# Patient Record
Sex: FEMALE | Race: WHITE | NOT HISPANIC OR LATINO | Employment: FULL TIME | URBAN - METROPOLITAN AREA
[De-identification: names, ages, dates, MRNs, and addresses within clinical notes are randomized per-mention and may not be internally consistent; named-entity substitution may affect disease eponyms.]

---

## 2017-01-01 ENCOUNTER — TRANSFERRED RECORDS (OUTPATIENT)
Dept: HEALTH INFORMATION MANAGEMENT | Facility: CLINIC | Age: 22
End: 2017-01-01

## 2017-01-01 LAB — PAP SMEAR - HIM PATIENT REPORTED: NEGATIVE

## 2019-03-12 ENCOUNTER — OFFICE VISIT (OUTPATIENT)
Dept: FAMILY MEDICINE | Facility: CLINIC | Age: 24
End: 2019-03-12
Payer: COMMERCIAL

## 2019-03-12 VITALS
DIASTOLIC BLOOD PRESSURE: 70 MMHG | TEMPERATURE: 97.4 F | OXYGEN SATURATION: 99 % | WEIGHT: 167 LBS | HEART RATE: 103 BPM | HEIGHT: 63 IN | BODY MASS INDEX: 29.59 KG/M2 | SYSTOLIC BLOOD PRESSURE: 122 MMHG

## 2019-03-12 DIAGNOSIS — Z00.00 ENCOUNTER FOR ROUTINE ADULT HEALTH EXAMINATION WITHOUT ABNORMAL FINDINGS: Primary | ICD-10-CM

## 2019-03-12 DIAGNOSIS — Z11.3 SCREENING EXAMINATION FOR VENEREAL DISEASE: ICD-10-CM

## 2019-03-12 DIAGNOSIS — E55.9 VITAMIN D DEFICIENCY: ICD-10-CM

## 2019-03-12 DIAGNOSIS — Z30.09 FAMILY PLANNING COUNSELING: ICD-10-CM

## 2019-03-12 LAB
DEPRECATED CALCIDIOL+CALCIFEROL SERPL-MC: 24 UG/L (ref 20–75)
GLUCOSE SERPL-MCNC: 92 MG/DL (ref 70–99)
HGB BLD-MCNC: 13.1 G/DL (ref 11.7–15.7)

## 2019-03-12 PROCEDURE — 36415 COLL VENOUS BLD VENIPUNCTURE: CPT | Performed by: PHYSICIAN ASSISTANT

## 2019-03-12 PROCEDURE — 85018 HEMOGLOBIN: CPT | Performed by: PHYSICIAN ASSISTANT

## 2019-03-12 PROCEDURE — 99395 PREV VISIT EST AGE 18-39: CPT | Performed by: PHYSICIAN ASSISTANT

## 2019-03-12 PROCEDURE — 82306 VITAMIN D 25 HYDROXY: CPT | Performed by: PHYSICIAN ASSISTANT

## 2019-03-12 PROCEDURE — 82947 ASSAY GLUCOSE BLOOD QUANT: CPT | Performed by: PHYSICIAN ASSISTANT

## 2019-03-12 SDOH — HEALTH STABILITY: MENTAL HEALTH: HOW OFTEN DO YOU HAVE A DRINK CONTAINING ALCOHOL?: 2-4 TIMES A MONTH

## 2019-03-12 ASSESSMENT — MIFFLIN-ST. JEOR: SCORE: 1488.38

## 2019-03-12 NOTE — LETTER
March 13, 2019      Janneth Herrera  [address] [facility] [address]        Dear ,    We are writing to inform you of your test results.    -Hemoglobin is normal.  There is no evidence of anemia.  -Glucose (diabetic screening test) is normal.  -Vitamin D level is normal and getting 1000 IU daily in your diet or supplements is recommended.       Resulted Orders   Glucose   Result Value Ref Range    Glucose 92 70 - 99 mg/dL      Comment:      Fasting specimen   Hemoglobin   Result Value Ref Range    Hemoglobin 13.1 11.7 - 15.7 g/dL   Vitamin D Deficiency   Result Value Ref Range    Vitamin D Deficiency screening 24 20 - 75 ug/L      Comment:      Season, race, dietary intake, and treatment affect the concentration of   25-hydroxy-Vitamin D. Values may decrease during winter months and increase   during summer months. Values 20-29 ug/L may indicate Vitamin D insufficiency   and values <20 ug/L may indicate Vitamin D deficiency.  Vitamin D determination is routinely performed by an immunoassay specific for   25 hydroxyvitamin D3.  If an individual is on vitamin D2 (ergocalciferol)   supplementation, please specify 25 OH vitamin D2 and D3 level determination by   LCMSMS test [id number].         If you have any questions or concerns, please call the clinic at the number listed above.       Sincerely,        Logan Romano PA-C

## 2019-03-12 NOTE — PROGRESS NOTES
SUBJECTIVE:   CC: Janneth Herrera is an 23 year old woman who presents for preventive health visit.     Healthy Habits:    Do you get at least three servings of calcium containing foods daily (dairy, green leafy vegetables, etc.)? yes    Amount of exercise or daily activities, outside of work: fair amount    Problems taking medications regularly Not applicable    Medication side effects: No    Have you had an eye exam in the past two years? no    Do you see a dentist twice per year? yes    Do you have sleep apnea, excessive snoring or daytime drowsiness?no  Pap smear done on this date: 2017 (approximately), by this group: OB/GYN in [facility] results were normal.       Today's PHQ-2 Score:   PHQ-2 ( 1999 Pfizer) 3/12/2019   Q1: Little interest or pleasure in doing things 0   Q2: Feeling down, depressed or hopeless 0   PHQ-2 Score 0       Abuse: Current or Past(Physical, Sexual or Emotional)- No  Do you feel safe in your environment? Yes    Social History     Tobacco Use     Smoking status: Never Smoker     Smokeless tobacco: Never Used   Substance Use Topics     Alcohol use: Yes     Frequency: 2-4 times a month     If you drink alcohol do you typically have >3 drinks per day or >7 drinks per week? No                     Reviewed orders with patient.  Reviewed health maintenance and updated orders accordingly - Yes  There is no problem list on file for this patient.    Past Surgical History:   Procedure Laterality Date     AS REMOVAL ADENOIDS,PRIMARY,<11 Y/O         Social History     Tobacco Use     Smoking status: Never Smoker     Smokeless tobacco: Never Used   Substance Use Topics     Alcohol use: Yes     Frequency: 2-4 times a month     Family History   Problem Relation Age of Onset     Pancreatic Cancer Maternal Grandfather      Skin Cancer No family hx of         unknown relative         No current outpatient medications on file.     Allergies   Allergen Reactions     Amoxicillin      No lab results found.  "    Mammogram not appropriate for this patient based on age.    Pertinent mammograms are reviewed under the imaging tab.  History of abnormal Pap smear: NO - age 21-29 PAP every 3 years recommended     Reviewed and updated as needed this visit by clinical staff  Tobacco  Allergies  Meds  Med Hx  Surg Hx  Fam Hx  Soc Hx        Reviewed and updated as needed this visit by Provider  Tobacco  Med Hx  Surg Hx  Fam Hx  Soc Hx       History reviewed. No pertinent past medical history.   Past Surgical History:   Procedure Laterality Date     AS REMOVAL ADENOIDS,PRIMARY,<11 Y/O         ROS:  CONSTITUTIONAL: NEGATIVE for fever, chills, change in weight  INTEGUMENTARU/SKIN: NEGATIVE for worrisome rashes, moles or lesions  EYES: NEGATIVE for vision changes or irritation  ENT: NEGATIVE for ear, mouth and throat problems  RESP: NEGATIVE for significant cough or SOB  BREAST: NEGATIVE for masses, tenderness or discharge  CV: NEGATIVE for chest pain, palpitations or peripheral edema  GI: NEGATIVE for nausea, abdominal pain, heartburn, or change in bowel habits  : NEGATIVE for unusual urinary or vaginal symptoms. Periods are regular.  MUSCULOSKELETAL: NEGATIVE for significant arthralgias or myalgia  NEURO: NEGATIVE for weakness, dizziness or paresthesias  PSYCHIATRIC: NEGATIVE for changes in mood or affect    OBJECTIVE:   /70   Pulse 103   Temp 97.4  F (36.3  C) (Tympanic)   Ht 1.611 m (5' 3.43\")   Wt 75.8 kg (167 lb)   LMP 03/10/2019   SpO2 99%   BMI 29.19 kg/m    EXAM:  GENERAL: healthy, alert and no distress  EYES: Eyes grossly normal to inspection, PERRL and conjunctivae and sclerae normal  HENT: ear canals and TM's normal, nose and mouth without ulcers or lesions  NECK: no adenopathy, no asymmetry, masses, or scars and thyroid normal to palpation  RESP: lungs clear to auscultation - no rales, rhonchi or wheezes  CV: regular rate and rhythm, normal S1 S2, no S3 or S4, no murmur, click or rub, no " "peripheral edema and peripheral pulses strong  ABDOMEN: soft, nontender, no hepatosplenomegaly, no masses and bowel sounds normal  MS: no gross musculoskeletal defects noted, no edema  SKIN: no suspicious lesions or rashes  NEURO: Normal strength and tone, mentation intact and speech normal  PSYCH: mentation appears normal, affect normal/bright    Diagnostic Test Results:  none     ASSESSMENT/PLAN:   1. Encounter for routine adult health examination without abnormal findings  - Glucose  - Hemoglobin  - OB/GYN REFERRAL    2. Family planning counseling  Janneth in considering starting a family.  Discussed diet changes and prenatal vitamin recommendations. Referral given to OBGYN per request  - OB/GYN REFERRAL    8. Vitamin D deficiency  - Vitamin D Deficiency    COUNSELING:   Reviewed preventive health counseling, as reflected in patient instructions       Regular exercise       Healthy diet/nutrition       Family planning    BP Readings from Last 1 Encounters:   03/12/19 122/70     Estimated body mass index is 29.19 kg/m  as calculated from the following:    Height as of this encounter: 1.611 m (5' 3.43\").    Weight as of this encounter: 75.8 kg (167 lb).           reports that  has never smoked. she has never used smokeless tobacco.      Counseling Resources:  ATP IV Guidelines  Pooled Cohorts Equation Calculator  Breast Cancer Risk Calculator  FRAX Risk Assessment  ICSI Preventive Guidelines  Dietary Guidelines for Americans, 2010  USDA's MyPlate  ASA Prophylaxis  Lung CA Screening    Logan Romano PA-C  [facility]  "

## 2019-03-12 NOTE — Clinical Note
Pap smear done on this date: 2017 (approximately), by this group: OB/GYN in [facility] results were normal.

## 2019-03-12 NOTE — PATIENT INSTRUCTIONS
Preventive Health Recommendations  Female Ages 21 to 25     Yearly exam:     See your health care provider every year in order to  o Review health changes.   o Discuss preventive care.    o Review your medicines if your doctor has prescribed any.      You should be tested each year for STDs (sexually transmitted diseases).       Talk to your provider about how often you should have cholesterol testing.      Get a Pap test every three years. If you have an abnormal result, your doctor may have you test more often.      If you are at risk for diabetes, you should have a diabetes test (fasting glucose).     Shots:     Get a flu shot each year.     Get a tetanus shot every 10 years.     Consider getting the shot (vaccine) that prevents cervical cancer (Gardasil).    Nutrition:     Eat at least 5 servings of fruits and vegetables each day.    Eat whole-grain bread, whole-wheat pasta and brown rice instead of white grains and rice.    Get adequate Calcium and Vitamin D.     Lifestyle    Exercise at least 150 minutes a week each week (30 minutes a day, 5 days a week). This will help you control your weight and prevent disease.    Limit alcohol to one drink per day.    No smoking.     Wear sunscreen to prevent skin cancer.    See your dentist every six months for an exam and cleaning.      Nutrition During Pregnancy    Having a healthy baby depends mostly on you. What you eat matters to your baby and your health. During pregnancy, you will likely need about 300 more calories per day than before you became pregnant. Each day, try to eat the number of servings listed here for each food group. In addition, cut down on salt and caffeine. Limit the amount of sweets and high-fat foods you eat. Don t smoke or drink alcohol.  Important: See your healthcare provider as often as requested. If you have any questions, be sure to ask them.  Fruits  2 cups  Examples of 1-cup servings:  1 medium apple  1 medium orange  1 medium  banana  1 cup chopped fruit  1 cup 100% fruit juice (pasteurized)  1/2 cup dried fruit Vegetables  2-1/2 to 3 cups   Examples of 1 servin cups raw, leafy greens  1 cup raw or cooked cut-up vegetables  1 cup 100% vegetable juice (pasteurized) Grains & Cereals*  6 to 8 ounces  Examples of 1-ounce servings:  1 slice bread  1/2 cup cooked rice  1/2 cup cooked cereal  1/2 cup pasta  1 ounce cold cereal Fats & Oils  6 to 8 teaspoons   Dairy**  3 cups  Examples of 1-cup servings:  1 cup milk  1 cup yogurt  1-1/2 ounces natural cheese  2 ounces processed cheese Protein---  5 to 6-1/2 ounces  Examples of 1-ounce servings:  1 egg  1 ounce of lean meat, poultry, or fish  1/4 cup cooked beans  1 tablespoon peanut butter  1/2 ounce nuts Fluids  8 or more 8-ounce glasses  Examples:  Water  Diluted juices: Apple, orange, cranberry  Mineral water  Clear soups, broth     *Note: Choose whole grains whenever possible.  ** Note: Try to choose low-fat options; avoid soft cheeses and unpasteurized milk.  --- Notes: Avoid raw or undercooked meats, eggs, and seafood. fish, and shellfish. Also, some types of fish, like shark, swordfish, and bassem mackerel should not be eaten during pregnancy. Avoid hot dogs, luncheon meats, and cold cuts unless heated to steaming just before being served. Ask your healthcare provider about safe choices.     Prenatal supplements  A prenatal supplement is a pill that you take daily during pregnancy. It helps make sure you re getting the right amount of certain nutrients that are important to your baby. Ask your healthcare provider to help you choose the best one for you. Important nutrients during pregnancy include:    Folic acid. It's best to start taking this supplement 1 month before you start trying to get pregnant. Folic acid helps prevent certain problems in your baby. During pregnancy, you need to take 400 micrograms (mcg) of folic acid every day for the first 2 to 3 months after conception, and  then 600 mcg is needed for growing fetus and placenta.    Iron, calcium, and vitamin D. You may also be advised to take these supplements during pregnancy. They help keep you and your baby healthy. Be sure to take them at different times because calcium makes it hard for the body to absorb iron. Taking iron with orange juice helps to increase its absorption. Iron: 27 mg daily  DHA: 200 mcg per day   Date Last Reviewed: 12/1/2017 2000-2018 [employer]. [address] [place] [address], [place] [address]. All rights reserved. This information is not intended as a substitute for professional medical care. Always follow your healthcare professional's instructions.

## 2019-03-13 NOTE — RESULT ENCOUNTER NOTE
Letter sent to patient with results.    Logan Romano PA-C  [facility]

## 2019-10-17 ENCOUNTER — NURSE TRIAGE (OUTPATIENT)
Dept: NURSING | Facility: CLINIC | Age: 24
End: 2019-10-17

## 2019-10-17 ENCOUNTER — OFFICE VISIT (OUTPATIENT)
Dept: FAMILY MEDICINE | Facility: CLINIC | Age: 24
End: 2019-10-17
Payer: COMMERCIAL

## 2019-10-17 VITALS
HEIGHT: 63 IN | WEIGHT: 172 LBS | TEMPERATURE: 99.2 F | DIASTOLIC BLOOD PRESSURE: 74 MMHG | OXYGEN SATURATION: 99 % | SYSTOLIC BLOOD PRESSURE: 122 MMHG | BODY MASS INDEX: 30.48 KG/M2 | HEART RATE: 82 BPM

## 2019-10-17 DIAGNOSIS — Z23 NEED FOR PROPHYLACTIC VACCINATION AND INOCULATION AGAINST INFLUENZA: ICD-10-CM

## 2019-10-17 DIAGNOSIS — Z31.9 DESIRE FOR PREGNANCY: ICD-10-CM

## 2019-10-17 DIAGNOSIS — Z32.00 ENCOUNTER FOR PREGNANCY TEST, RESULT UNKNOWN: Primary | ICD-10-CM

## 2019-10-17 LAB
B-HCG SERPL-ACNC: 45 IU/L (ref 0–5)
HCG UR QL: NEGATIVE

## 2019-10-17 PROCEDURE — 99202 OFFICE O/P NEW SF 15 MIN: CPT | Mod: 25 | Performed by: INTERNAL MEDICINE

## 2019-10-17 PROCEDURE — 81025 URINE PREGNANCY TEST: CPT | Performed by: INTERNAL MEDICINE

## 2019-10-17 PROCEDURE — 90686 IIV4 VACC NO PRSV 0.5 ML IM: CPT | Performed by: INTERNAL MEDICINE

## 2019-10-17 PROCEDURE — 84702 CHORIONIC GONADOTROPIN TEST: CPT | Performed by: INTERNAL MEDICINE

## 2019-10-17 PROCEDURE — 90471 IMMUNIZATION ADMIN: CPT | Performed by: INTERNAL MEDICINE

## 2019-10-17 PROCEDURE — 36415 COLL VENOUS BLD VENIPUNCTURE: CPT | Performed by: INTERNAL MEDICINE

## 2019-10-17 ASSESSMENT — MIFFLIN-ST. JEOR: SCORE: 1499.32

## 2019-10-17 NOTE — PROGRESS NOTES
"Subjective     Janneth Mcclain is a 24 year old female who presents to clinic today for the following health issues:    HPI   Concern - Pt wants to take a pregnancy test to confirm   janneth and her  have been trying to get pregnant for about 5 months.  Her LMP was about 4 weeks ago.  She took a few \"cheap\" pregnancy test at home which had a very faintly positive line.  She took a digital pregnancy test yesterday which was positive.  She is here for confirmatory test.  She denies any bleeding, does have some mild cramping.      Reviewed and updated as needed this visit by Provider         Review of Systems   ROS COMP: , psych reviewed,  otherwise negative unless noted above.        Objective    /74 (BP Location: Right arm, Patient Position: Sitting, Cuff Size: Adult Regular)   Pulse 82   Temp 99.2  F (37.3  C) (Tympanic)   Ht 1.6 m (5' 3\")   Wt 78 kg (172 lb)   LMP 09/19/2019   SpO2 99%   BMI 30.47 kg/m    Body mass index is 30.47 kg/m .  Physical Exam   GENERAL: healthy, alert and no distress    Diagnostic Test Results:  Results for orders placed or performed in visit on 10/17/19 (from the past 24 hour(s))   HCG Qual, Urine ([id number])   Result Value Ref Range    HCG Qual Urine Negative NEG^Negative           Assessment & Plan     1. Encounter for pregnancy test, result unknown  The urine pregnancy test here today is negative.  She is quite tearful in hearing this news.  We will check a serum hCG.    - HCG Qual, Urine ([id number])  - HCG Quantitative Pregnancy, Blood ([id number])    2. Desire for pregnancy  Given referral to OB/GYN for ongoing management of desire for pregnancy.  - OB/GYN REFERRAL    3. Need for prophylactic vaccination and inoculation against influenza  - INFLUENZA VACCINE IM > 6 MONTHS VALENT IIV4 [32221]  - Vaccine Administration, Initial [66275]     F/U - next steps TBD based on results of blood work.    Mayda Rubio MD  [facility]    "

## 2019-10-17 NOTE — TELEPHONE ENCOUNTER
Calling for lab results.  Results have not been viewed by the provider. Results not given to patient.  Alejandra Borrego RN, [facility]    Reason for Disposition    Health Information question, no triage required and triager able to answer question    Protocols used: INFORMATION ONLY CALL-A-AH

## 2019-10-18 ENCOUNTER — TELEPHONE (OUTPATIENT)
Dept: FAMILY MEDICINE | Facility: CLINIC | Age: 24
End: 2019-10-18

## 2019-10-18 NOTE — TELEPHONE ENCOUNTER
Attempted to call patient to inform her of MD result note below. Left a detailed message (Okay to leave a detailed message per note below) will clinic call back number.     Informed patient via voicemail MD's note as well as that she needs to call the [facility] in [place] as they have their lab open until noon tomorrow to schedule a lab only appointment. Informed patient that Lab test has been ordered.     MD Rubio's note below:    Voicemail left to call back regarding lab results.  The serum hcg is detectable, but low.  I would recommend she come in tomorrow ([facility] lab open til noon) to repeat the serum test to see if it is trending up or down.    Addis Rivera RN, BSN  [facility]

## 2019-10-18 NOTE — TELEPHONE ENCOUNTER
Reason for Call:  Other call back    Detailed comments: Patient returning call for results. Unable to get a nurse. Please call her back     Phone Number Patient can be reached at: Cell number on file:    Telephone Information:   Mobile [phone]       Best Time: any    Can we leave a detailed message on this number? YES    Call taken on 10/18/2019 at 11:19 AM by Soledad Arredondo

## 2019-10-19 ENCOUNTER — NURSE TRIAGE (OUTPATIENT)
Dept: NURSING | Facility: CLINIC | Age: 24
End: 2019-10-19

## 2019-10-19 DIAGNOSIS — O20.0 BLOODY SHOW AND CRAMPING IN EARLY PREGNANCY: Primary | ICD-10-CM

## 2019-10-19 DIAGNOSIS — Z32.00 ENCOUNTER FOR PREGNANCY TEST, RESULT UNKNOWN: ICD-10-CM

## 2019-10-19 DIAGNOSIS — Z34.90 EARLY STAGE OF PREGNANCY: ICD-10-CM

## 2019-10-19 LAB — B-HCG SERPL-ACNC: 112 IU/L (ref 0–5)

## 2019-10-19 PROCEDURE — 36415 COLL VENOUS BLD VENIPUNCTURE: CPT | Performed by: INTERNAL MEDICINE

## 2019-10-19 PROCEDURE — 84702 CHORIONIC GONADOTROPIN TEST: CPT | Performed by: INTERNAL MEDICINE

## 2019-10-19 NOTE — TELEPHONE ENCOUNTER
Pt calls in requesting lab result from today >    HCG Quantitative > 112  The above lab result read back to pt    Protocol and care advice reviewed  Caller states understanding of the recommended disposition    Advised to call back if further questions or concerns    Julio Whitt RN / [facility]        Reason for Disposition    Caller requesting lab results    Additional Information    Lab result questions    Protocols used: PCP CALL - NO TRIAGE-A-AH, INFORMATION ONLY CALL-A-AH

## 2019-10-21 DIAGNOSIS — Z34.90 EARLY STAGE OF PREGNANCY: ICD-10-CM

## 2019-10-21 PROCEDURE — 84702 CHORIONIC GONADOTROPIN TEST: CPT | Performed by: INTERNAL MEDICINE

## 2019-10-21 PROCEDURE — 36415 COLL VENOUS BLD VENIPUNCTURE: CPT | Performed by: INTERNAL MEDICINE

## 2019-10-21 NOTE — TELEPHONE ENCOUNTER
Please call Janneth with lab results.  The hcg is heading in the right direction.  Let's have her check one more level today or tomorrow at the lab.  If it has increased appropriately, we can be fully reassured.      Mayda Rubio MD

## 2019-10-21 NOTE — TELEPHONE ENCOUNTER
Called patient to inform her of MD Rubio's note below. Scheduled patient today for recheck of HCG lab only appointment. Patient verbalized understanding and agrees with plan.     Addis Rivera RN, BSN  [facility]

## 2019-10-22 LAB — B-HCG SERPL-ACNC: 210 IU/L (ref 0–5)

## 2019-10-26 ENCOUNTER — NURSE TRIAGE (OUTPATIENT)
Dept: NURSING | Facility: CLINIC | Age: 24
End: 2019-10-26

## 2019-10-26 NOTE — TELEPHONE ENCOUNTER
"Janneth calling concerned that has tripped and fell several times in the past few days. She is six weeks pregnant.  She was \"running and fell because my shoelaces were untied\". She is not having and abdominal pain or bleeding, and denies any other symptoms. Triage protocol recommends home care. Encouraged Janneth to call back if she has any other questions/concerns.     Mayda Ruiz, RN, BSN  [facility]      Additional Information    Negative: Major injury from dangerous force (e.g., fall > 10 feet or 3 meters)    Negative: [1] Major bleeding (e.g., actively dripping or spurting) AND [2] can't be stopped    Negative: Shock suspected (e.g., cold/pale/clammy skin, too weak to stand)    Negative: SEVERE abdominal pain    Negative: [1] Vaginal bleeding AND [2] pregnant > 20 weeks    Negative: Sounds like a life-threatening emergency to the triager    Negative: [1] Vaginal bleeding AND [2] pregnant < 20 weeks (Exception: single episode of spotting)    Negative: [1] Abdominal pain (not severe) AND [2] pregnant < 20 weeks    Negative: [1] Abdominal pain (not severe) AND [2] present > 1 hour    Negative: Sounds like a serious injury to the triager    Negative: [1] Pregnant 20 or more weeks AND [2] fall to ground or floor (e.g., walking and tripped)    Negative: [1] Pregnant 20 or more weeks AND [2] contractions    Negative: [1] Pregnant 20 or more weeks AND [2] abdomen pain    Negative: [1] Pregnant 20 or more weeks AND [2] leaking fluid    Negative: [1] Pregnant 23 or more weeks AND [2] no fetal movements or decreased fetal movements (e.g., kick count < 5 in 1 hour or < 10 in 2 hours)    Negative: Suspicious history for the injury    Negative: [1] Caller has URGENT question AND [2] triager unable to answer question    Negative: [1] Caller has NON-URGENT question AND [2] triager unable to answer question    Negative: [1] Last tetanus shot > 5 years ago AND [2] DIRTY cut or scrape    [1] Pregnant < 20 weeks AND [2] " fall to ground (e.g., walking and tripped)    Protocols used: PREGNANCY - FALL-A-AH

## 2020-03-11 ENCOUNTER — HEALTH MAINTENANCE LETTER (OUTPATIENT)
Age: 25
End: 2020-03-11

## 2020-06-25 LAB
ABO + RH BLD: NORMAL
ABO + RH BLD: NORMAL
BLD GP AB SCN SERPL QL: NORMAL
HBV SURFACE AG SERPL QL IA: NORMAL
HIV 1+2 AB+HIV1 P24 AG SERPL QL IA: NON REACTIVE
TREPONEMA ANTIBODIES: NON REACTIVE

## 2020-08-06 LAB — RUBELLA ABY IGG: NORMAL

## 2020-08-21 ENCOUNTER — TRANSCRIBE ORDERS (OUTPATIENT)
Dept: MATERNAL FETAL MEDICINE | Facility: CLINIC | Age: 25
End: 2020-08-21

## 2020-08-21 DIAGNOSIS — O26.90 PREGNANCY RELATED CONDITION, ANTEPARTUM: Primary | ICD-10-CM

## 2020-09-24 ENCOUNTER — PRE VISIT (OUTPATIENT)
Dept: MATERNAL FETAL MEDICINE | Facility: CLINIC | Age: 25
End: 2020-09-24

## 2020-10-01 ENCOUNTER — OFFICE VISIT (OUTPATIENT)
Dept: MATERNAL FETAL MEDICINE | Facility: CLINIC | Age: 25
End: 2020-10-01
Attending: OBSTETRICS & GYNECOLOGY
Payer: COMMERCIAL

## 2020-10-01 ENCOUNTER — HOSPITAL ENCOUNTER (OUTPATIENT)
Dept: ULTRASOUND IMAGING | Facility: CLINIC | Age: 25
End: 2020-10-01
Attending: OBSTETRICS & GYNECOLOGY
Payer: COMMERCIAL

## 2020-10-01 DIAGNOSIS — O26.90 PREGNANCY RELATED CONDITION, ANTEPARTUM: ICD-10-CM

## 2020-10-01 DIAGNOSIS — Z82.79 FAMILY HISTORY OF BICUSPID AORTIC VALVE: Primary | ICD-10-CM

## 2020-10-01 PROCEDURE — 76811 OB US DETAILED SNGL FETUS: CPT

## 2020-10-01 PROCEDURE — 96040 HC GENETIC COUNSELING, EACH 30 MINUTES: CPT | Performed by: GENETIC COUNSELOR, MS

## 2020-10-01 PROCEDURE — 76811 OB US DETAILED SNGL FETUS: CPT | Mod: 26 | Performed by: OBSTETRICS & GYNECOLOGY

## 2020-10-01 PROCEDURE — 99201 PR OFFICE/OUTPT VISIT, NEW, LEVEL I: CPT | Mod: 25 | Performed by: OBSTETRICS & GYNECOLOGY

## 2020-10-01 NOTE — PROGRESS NOTES
Please see ultrasound report under imaging tab for details on ultrasound performed today.    Lissette Flores MD  , OB/GYN  Maternal-Fetal Medicine  [email or web address]  [phone] (Academic office)  [phone] (Pager)

## 2020-10-01 NOTE — PROGRESS NOTES
[facility]  Genetic Counseling Consult    Patient:  Janneth Mcclain YOB: 1995   Date of Service:  10/01/20      Janneth Mcclain was seen at the [facility] for genetic consultation as part of her appointment for comprehensive ultrasound.  The indication for genetic counseling is a family history of bicuspid aortic valve. She was accompanied to today's visit by her partner, Zac.       Impression/Plan:   1. Janneth had a quad screen earlier in pregnancy, which was screen negative.    2. Janneth had a comprehensive (level II) ultrasound today.  Please see the ultrasound report for further details.    3. The patient declines genetic amniocentesis and additional maternal serum screening today.    Pregnancy History:   /Parity:    Age at Delivery: 25 year old  DAKSHA: 3/2/2021, by Ultrasound  Gestational Age: 18w2d    No significant complications or exposures were reported in the current pregnancy.    Janneth barreto pregnancy history is significant for:  o 2019: SAB, blighted ovum, D&C  o 3/2020: SAB, D&C  - Tetrasomy 13    Medical History:   Janneth barreto reported medical history is not expected to impact pregnancy management or risks to fetal development.       Family History:   A three-generation pedigree was obtained, and is scanned under the  Media  tab.   The following significant findings were reported by Janneth:    Janneth's father has a bicuspid aortic valve. We discussed that congenital heart defects can be caused by genetic factors, chromosomal abnormalities, or environmental exposures. Most isolated congenital heart defects are likely caused by the combination of several genetic and environmental factors and have a multifactorial inheritance pattern. Research has shown a significant genetic component to isolated left sided congenital heart defects which includes a spectrum of cardiac anomalies ranging from bicuspid  aortic valve at the milder end of the spectrum to hypoplastic left heart at the more severe end of the spectrum. The recurrence risk for first degree relative to have a left-sided congenital heart defect is approximately 8%. An echocardiogram is recommended to screen for congenital heart defects in first degree relatives. Janneth has not completed an echocardiogram to date.    Zac has a paternal first cousin with VATER syndrome (also known as VACTERL). VATER is characterized by vertebral anomalies, anal atresia, genitourinary anomalies, cardiac defects, renal anomalies, and/or limb abnormalities. Most cases of VATER are sporadic, meaning it occurs in individuals with no family history of this condition. There are likely multiple genetic and environmental factors that play a role in developing VATER. This family history is not thought to increase the chance for VATER in this current pregnancy.    Otherwise, the reported family history is negative for multiple miscarriages, stillbirths, birth defects, mental retardation, known genetic conditions, and consanguinity.       Carrier Screening:   The patient reports that she and the father of the pregnancy have  ancestry:      Cystic fibrosis is an autosomal recessive genetic condition that occurs with increased frequency in individuals of  ancestry and carrier screening for this condition is available.  In addition,  screening in the [facility] includes cystic fibrosis.      Expanded carrier screening for mutations in a large panel of genes associated with autosomal recessive conditions including cystic fibrosis, spinal muscular atrophy, and others, is now available.      The patient has declined the carrier screening options reviewed today.       Risk Assessment for Chromosome Conditions:   We explained that the risk for fetal chromosome abnormalities increases with maternal age. We discussed specific features of common chromosome  abnormalities, including Down syndrome, trisomy 13, trisomy 18, and sex chromosome trisomies.      - At age 25 at midtrimester, the risk to have a baby with Down syndrome is 1 in 1040.    - At age 25 at midtrimester, the risk to have a baby with any chromosome abnormality is 1 in 520.       Janneth had maternal serum screening earlier in pregnancy.    Quad screen    Maternal plasma AFP, hCG, estriol, and inhibin measurement between 15-24 weeks gestation    Provides risk assessment for fetal Down syndrome, trisomy 18, and neural tube defects    Janneth had a quad screen earlier in pregnancy; we reviewed the results today, which were Screen Negative    Chemical values were as follows    AFP 1.71 MoM    This screen gave the following risk assessments:    Down syndrome risk= 1:10,000    Trisomy 18 risk= not increased    Open neural tube defects risk= 1:1,590       Testing Options:   We discussed the following options:   Comprehensive (Level II) ultrasound: Detailed ultrasound performed between 18-22 weeks gestation to screen for major birth defects and markers for aneuploidy.      We reviewed the benefits and limitations of this testing.  Screening tests provide a risk assessment specific to the pregnancy for certain fetal chromosome abnormalities, but cannot definitively diagnose or exclude a fetal chromosome abnormality.  Follow-up genetic counseling and consideration of diagnostic testing is recommended with any abnormal screening result.     Diagnostic tests carry inherent risks- including risk of miscarriage- that require careful consideration.  These tests can detect fetal chromosome abnormalities with greater than 99% certainty.  Results can be compromised by maternal cell contamination or mosaicism, and are limited by the resolution of cytogenetic G-banding technology.  There is no screening nor diagnostic test that can detect all forms of birth defects or mental disability.    It was a pleasure to be involved  with Janneth christianson. Face-to-face time of the meeting was 35 minutes.      Kaitlin Adams MS, [facility]  Maternal Fetal Medicine  [facility]  Ph: [phone]  [email or web address]

## 2020-10-19 ENCOUNTER — HOSPITAL ENCOUNTER (OUTPATIENT)
Facility: CLINIC | Age: 25
End: 2020-10-19
Admitting: OBSTETRICS & GYNECOLOGY
Payer: COMMERCIAL

## 2020-10-19 ENCOUNTER — HOSPITAL ENCOUNTER (OUTPATIENT)
Facility: CLINIC | Age: 25
Discharge: HOME | End: 2020-10-19
Attending: OBSTETRICS & GYNECOLOGY | Admitting: OBSTETRICS & GYNECOLOGY
Payer: COMMERCIAL

## 2020-10-19 VITALS
BODY MASS INDEX: 31.54 KG/M2 | DIASTOLIC BLOOD PRESSURE: 68 MMHG | HEIGHT: 63 IN | TEMPERATURE: 99.3 F | SYSTOLIC BLOOD PRESSURE: 124 MMHG | WEIGHT: 178 LBS

## 2020-10-19 PROCEDURE — G0463 HOSPITAL OUTPT CLINIC VISIT: HCPCS

## 2020-10-19 RX ORDER — ONDANSETRON 2 MG/ML
4 INJECTION INTRAMUSCULAR; INTRAVENOUS EVERY 6 HOURS PRN
Status: DISCONTINUED | OUTPATIENT
Start: 2020-10-19 | End: 2020-10-19 | Stop reason: HOSPADM

## 2020-10-19 ASSESSMENT — MIFFLIN-ST. JEOR: SCORE: 1513.59

## 2020-10-20 NOTE — PLAN OF CARE
Patient comes to triage after her large dog jumped on her abdomen.    EUM applied. FHTs heard by doppler. VSS. Admission database obtained, and prenatal reviewed.    Update to Dr. Santana, and discharge order received.    Discharge instructions given with verbal understanding, and patient discharged home.    Patient will follow up in clinic at regular appts, or contact on-call MD with any further concerns.

## 2020-10-20 NOTE — DISCHARGE INSTRUCTIONS
Discharge Instruction for Undelivered Patients      You were seen for: Labor Assessment and Fetal Assessment for abdominal trauma  We Consulted: Dr. Santana  You had (Test or Medicine):fetal doptones & uterine monitoring     Diet:   Drink 8 to 12 glasses of liquids (milk, juice, water) every day.  You may eat meals and snacks.     Activity:  Call your doctor or nurse midwife if your baby is moving less than usual.     Call your provider if you notice:  Swelling in your face or increased swelling in your hands or legs.  Headaches that are not relieved by Tylenol (acetaminophen).  Changes in your vision (blurring: seeing spots or stars.)  Nausea (sick to your stomach) and vomiting (throwing up).   Weight gain of 5 pounds or more per week.  Heartburn that doesn't go away.  Signs of bladder infection: pain when you urinate (use the toilet), need to go more often and more urgently.  The bag of farley (rupture of membranes) breaks, or you notice leaking in your underwear.  Bright red blood in your underwear.  Abdominal (lower belly) or stomach pain.  For first baby: Contractions (tightening) less than 5 minutes apart for one hour or more.  Second (plus) baby: Contractions (tightening) less than 10 minutes apart and getting stronger.  *If less than 34 weeks: Contractions (tightenings) more than 6 times in one hour.  Increase or change in vaginal discharge (note the color and amount)      Follow-up:  As scheduled in the clinic, or contact on call MD if any further issues.

## 2020-10-22 ENCOUNTER — HOSPITAL ENCOUNTER (OUTPATIENT)
Dept: ULTRASOUND IMAGING | Facility: CLINIC | Age: 25
End: 2020-10-22
Attending: OBSTETRICS & GYNECOLOGY
Payer: COMMERCIAL

## 2020-10-22 ENCOUNTER — OFFICE VISIT (OUTPATIENT)
Dept: MATERNAL FETAL MEDICINE | Facility: CLINIC | Age: 25
End: 2020-10-22
Attending: OBSTETRICS & GYNECOLOGY
Payer: COMMERCIAL

## 2020-10-22 DIAGNOSIS — Z82.79 FAMILY HISTORY OF BICUSPID AORTIC VALVE: Primary | ICD-10-CM

## 2020-10-22 DIAGNOSIS — Z82.79 FAMILY HISTORY OF BICUSPID AORTIC VALVE: ICD-10-CM

## 2020-10-22 PROCEDURE — 76827 ECHO EXAM OF FETAL HEART: CPT | Mod: 26 | Performed by: OBSTETRICS & GYNECOLOGY

## 2020-10-22 PROCEDURE — 99207 PR NO CHARGE LOS: CPT | Performed by: OBSTETRICS & GYNECOLOGY

## 2020-10-22 PROCEDURE — 93325 DOPPLER ECHO COLOR FLOW MAPG: CPT | Mod: 26 | Performed by: OBSTETRICS & GYNECOLOGY

## 2020-10-22 PROCEDURE — 93325 DOPPLER ECHO COLOR FLOW MAPG: CPT

## 2020-10-22 PROCEDURE — 76816 OB US FOLLOW-UP PER FETUS: CPT | Mod: 26 | Performed by: OBSTETRICS & GYNECOLOGY

## 2020-10-22 PROCEDURE — 76816 OB US FOLLOW-UP PER FETUS: CPT

## 2020-10-22 PROCEDURE — 76825 ECHO EXAM OF FETAL HEART: CPT | Mod: 26 | Performed by: OBSTETRICS & GYNECOLOGY

## 2020-10-22 NOTE — PROGRESS NOTES
Please see ultrasound report under Imaging tab for details of today's ultrasound.    Evy Rodríguez MD  Maternal-Fetal Medicine

## 2021-01-03 ENCOUNTER — HEALTH MAINTENANCE LETTER (OUTPATIENT)
Age: 26
End: 2021-01-03

## 2021-02-02 LAB — GROUP B STREP PCR: NORMAL

## 2021-02-23 ENCOUNTER — ANESTHESIA EVENT (OUTPATIENT)
Dept: OBGYN | Facility: CLINIC | Age: 26
End: 2021-02-23
Payer: COMMERCIAL

## 2021-02-23 ENCOUNTER — HOSPITAL ENCOUNTER (INPATIENT)
Facility: CLINIC | Age: 26
LOS: 2 days | Discharge: HOME | End: 2021-02-25
Attending: OBSTETRICS & GYNECOLOGY | Admitting: OBSTETRICS & GYNECOLOGY
Payer: COMMERCIAL

## 2021-02-23 ENCOUNTER — ANESTHESIA (OUTPATIENT)
Dept: OBGYN | Facility: CLINIC | Age: 26
End: 2021-02-23
Payer: COMMERCIAL

## 2021-02-23 PROBLEM — Z34.90 PREGNANCY: Status: ACTIVE | Noted: 2021-02-23

## 2021-02-23 PROBLEM — O16.9 HYPERTENSION AFFECTING PREGNANCY: Status: ACTIVE | Noted: 2021-02-23

## 2021-02-23 LAB
ABO + RH BLD: NORMAL
ABO + RH BLD: NORMAL
ALT SERPL W P-5'-P-CCNC: 21 U/L (ref 0–50)
AST SERPL W P-5'-P-CCNC: 17 U/L (ref 0–45)
CREAT SERPL-MCNC: 0.52 MG/DL (ref 0.52–1.04)
ERYTHROCYTE [DISTWIDTH] IN BLOOD BY AUTOMATED COUNT: 12.4 % (ref 10–15)
GFR SERPL CREATININE-BSD FRML MDRD: >90 ML/MIN/{1.73_M2}
HCT VFR BLD AUTO: 36.6 % (ref 35–47)
HGB BLD-MCNC: 13 G/DL (ref 11.7–15.7)
LABORATORY COMMENT REPORT: NORMAL
MCH RBC QN AUTO: 32.6 PG (ref 26.5–33)
MCHC RBC AUTO-ENTMCNC: 35.5 G/DL (ref 31.5–36.5)
MCV RBC AUTO: 92 FL (ref 78–100)
PLATELET # BLD AUTO: 246 10E9/L (ref 150–450)
RBC # BLD AUTO: 3.99 10E12/L (ref 3.8–5.2)
RUPTURE OF FETAL MEMBRANES BY ROM PLUS: POSITIVE
SARS-COV-2 RNA RESP QL NAA+PROBE: NEGATIVE
SPECIMEN EXP DATE BLD: NORMAL
SPECIMEN SOURCE: NORMAL
T PALLIDUM AB SER QL: NONREACTIVE
URATE SERPL-MCNC: 4.3 MG/DL (ref 2.6–6)
WBC # BLD AUTO: 12.7 10E9/L (ref 4–11)

## 2021-02-23 PROCEDURE — 86780 TREPONEMA PALLIDUM: CPT | Performed by: OBSTETRICS & GYNECOLOGY

## 2021-02-23 PROCEDURE — 86901 BLOOD TYPING SEROLOGIC RH(D): CPT | Performed by: OBSTETRICS & GYNECOLOGY

## 2021-02-23 PROCEDURE — 84550 ASSAY OF BLOOD/URIC ACID: CPT | Performed by: OBSTETRICS & GYNECOLOGY

## 2021-02-23 PROCEDURE — 84112 EVAL AMNIOTIC FLUID PROTEIN: CPT | Performed by: OBSTETRICS & GYNECOLOGY

## 2021-02-23 PROCEDURE — C9803 HOPD COVID-19 SPEC COLLECT: HCPCS

## 2021-02-23 PROCEDURE — 82565 ASSAY OF CREATININE: CPT | Performed by: OBSTETRICS & GYNECOLOGY

## 2021-02-23 PROCEDURE — 84460 ALANINE AMINO (ALT) (SGPT): CPT | Performed by: OBSTETRICS & GYNECOLOGY

## 2021-02-23 PROCEDURE — 86900 BLOOD TYPING SEROLOGIC ABO: CPT | Performed by: OBSTETRICS & GYNECOLOGY

## 2021-02-23 PROCEDURE — 87635 SARS-COV-2 COVID-19 AMP PRB: CPT | Performed by: OBSTETRICS & GYNECOLOGY

## 2021-02-23 PROCEDURE — 84450 TRANSFERASE (AST) (SGOT): CPT | Performed by: OBSTETRICS & GYNECOLOGY

## 2021-02-23 PROCEDURE — 258N000003 HC RX IP 258 OP 636: Performed by: OBSTETRICS & GYNECOLOGY

## 2021-02-23 PROCEDURE — 120N000001 HC R&B MED SURG/OB

## 2021-02-23 PROCEDURE — 250N000011 HC RX IP 250 OP 636: Performed by: ANESTHESIOLOGY

## 2021-02-23 PROCEDURE — G0463 HOSPITAL OUTPT CLINIC VISIT: HCPCS | Mod: 25

## 2021-02-23 PROCEDURE — 59025 FETAL NON-STRESS TEST: CPT

## 2021-02-23 PROCEDURE — 250N000009 HC RX 250: Performed by: OBSTETRICS & GYNECOLOGY

## 2021-02-23 PROCEDURE — 85027 COMPLETE CBC AUTOMATED: CPT | Performed by: OBSTETRICS & GYNECOLOGY

## 2021-02-23 PROCEDURE — 370N000003 HC ANESTHESIA WARD SERVICE

## 2021-02-23 RX ORDER — NALBUPHINE HYDROCHLORIDE 10 MG/ML
2.5-5 INJECTION, SOLUTION INTRAMUSCULAR; INTRAVENOUS; SUBCUTANEOUS EVERY 6 HOURS PRN
Status: DISCONTINUED | OUTPATIENT
Start: 2021-02-23 | End: 2021-02-25 | Stop reason: HOSPADM

## 2021-02-23 RX ORDER — OXYTOCIN/0.9 % SODIUM CHLORIDE 30/500 ML
1-24 PLASTIC BAG, INJECTION (ML) INTRAVENOUS CONTINUOUS
Status: DISCONTINUED | OUTPATIENT
Start: 2021-02-23 | End: 2021-02-25 | Stop reason: HOSPADM

## 2021-02-23 RX ORDER — NALOXONE HYDROCHLORIDE 0.4 MG/ML
0.2 INJECTION, SOLUTION INTRAMUSCULAR; INTRAVENOUS; SUBCUTANEOUS
Status: DISCONTINUED | OUTPATIENT
Start: 2021-02-23 | End: 2021-02-25 | Stop reason: HOSPADM

## 2021-02-23 RX ORDER — ROPIVACAINE HYDROCHLORIDE 2 MG/ML
10 INJECTION, SOLUTION EPIDURAL; INFILTRATION; PERINEURAL ONCE
Status: COMPLETED | OUTPATIENT
Start: 2021-02-23 | End: 2021-02-23

## 2021-02-23 RX ORDER — FENTANYL CITRATE 50 UG/ML
50-100 INJECTION, SOLUTION INTRAMUSCULAR; INTRAVENOUS
Status: DISCONTINUED | OUTPATIENT
Start: 2021-02-23 | End: 2021-02-25 | Stop reason: HOSPADM

## 2021-02-23 RX ORDER — OXYCODONE AND ACETAMINOPHEN 5; 325 MG/1; MG/1
1 TABLET ORAL
Status: DISCONTINUED | OUTPATIENT
Start: 2021-02-23 | End: 2021-02-25 | Stop reason: HOSPADM

## 2021-02-23 RX ORDER — LIDOCAINE 40 MG/G
CREAM TOPICAL
Status: DISCONTINUED | OUTPATIENT
Start: 2021-02-23 | End: 2021-02-25 | Stop reason: HOSPADM

## 2021-02-23 RX ORDER — SODIUM CHLORIDE, SODIUM LACTATE, POTASSIUM CHLORIDE, CALCIUM CHLORIDE 600; 310; 30; 20 MG/100ML; MG/100ML; MG/100ML; MG/100ML
INJECTION, SOLUTION INTRAVENOUS CONTINUOUS
Status: DISCONTINUED | OUTPATIENT
Start: 2021-02-23 | End: 2021-02-25 | Stop reason: HOSPADM

## 2021-02-23 RX ORDER — CARBOPROST TROMETHAMINE 250 UG/ML
250 INJECTION, SOLUTION INTRAMUSCULAR
Status: DISCONTINUED | OUTPATIENT
Start: 2021-02-23 | End: 2021-02-25 | Stop reason: HOSPADM

## 2021-02-23 RX ORDER — ONDANSETRON 2 MG/ML
4 INJECTION INTRAMUSCULAR; INTRAVENOUS EVERY 6 HOURS PRN
Status: DISCONTINUED | OUTPATIENT
Start: 2021-02-23 | End: 2021-02-25 | Stop reason: HOSPADM

## 2021-02-23 RX ORDER — OXYTOCIN 10 [USP'U]/ML
10 INJECTION, SOLUTION INTRAMUSCULAR; INTRAVENOUS
Status: DISCONTINUED | OUTPATIENT
Start: 2021-02-23 | End: 2021-02-25 | Stop reason: HOSPADM

## 2021-02-23 RX ORDER — NALOXONE HYDROCHLORIDE 0.4 MG/ML
0.4 INJECTION, SOLUTION INTRAMUSCULAR; INTRAVENOUS; SUBCUTANEOUS
Status: DISCONTINUED | OUTPATIENT
Start: 2021-02-23 | End: 2021-02-25 | Stop reason: HOSPADM

## 2021-02-23 RX ORDER — EPHEDRINE SULFATE 50 MG/ML
5 INJECTION, SOLUTION INTRAMUSCULAR; INTRAVENOUS; SUBCUTANEOUS
Status: DISCONTINUED | OUTPATIENT
Start: 2021-02-23 | End: 2021-02-25 | Stop reason: HOSPADM

## 2021-02-23 RX ORDER — TRANEXAMIC ACID 10 MG/ML
1 INJECTION, SOLUTION INTRAVENOUS EVERY 30 MIN PRN
Status: DISCONTINUED | OUTPATIENT
Start: 2021-02-23 | End: 2021-02-25 | Stop reason: HOSPADM

## 2021-02-23 RX ORDER — ACETAMINOPHEN 325 MG/1
650 TABLET ORAL EVERY 4 HOURS PRN
Status: DISCONTINUED | OUTPATIENT
Start: 2021-02-23 | End: 2021-02-25 | Stop reason: HOSPADM

## 2021-02-23 RX ORDER — ONDANSETRON 4 MG/1
4 TABLET, ORALLY DISINTEGRATING ORAL EVERY 6 HOURS PRN
Status: DISCONTINUED | OUTPATIENT
Start: 2021-02-23 | End: 2021-02-25 | Stop reason: HOSPADM

## 2021-02-23 RX ORDER — IBUPROFEN 400 MG/1
800 TABLET, FILM COATED ORAL
Status: COMPLETED | OUTPATIENT
Start: 2021-02-23 | End: 2021-02-24

## 2021-02-23 RX ORDER — METHYLERGONOVINE MALEATE 0.2 MG/ML
200 INJECTION INTRAVENOUS
Status: DISCONTINUED | OUTPATIENT
Start: 2021-02-23 | End: 2021-02-25 | Stop reason: HOSPADM

## 2021-02-23 RX ORDER — OXYTOCIN/0.9 % SODIUM CHLORIDE 30/500 ML
100-340 PLASTIC BAG, INJECTION (ML) INTRAVENOUS CONTINUOUS PRN
Status: COMPLETED | OUTPATIENT
Start: 2021-02-23 | End: 2021-02-24

## 2021-02-23 RX ORDER — FENTANYL CITRATE 50 UG/ML
100 INJECTION, SOLUTION INTRAMUSCULAR; INTRAVENOUS ONCE
Status: COMPLETED | OUTPATIENT
Start: 2021-02-23 | End: 2021-02-23

## 2021-02-23 RX ADMIN — FENTANYL CITRATE 100 MCG: 50 INJECTION, SOLUTION INTRAMUSCULAR; INTRAVENOUS at 22:54

## 2021-02-23 RX ADMIN — Medication 2 MILLI-UNITS/MIN: at 14:43

## 2021-02-23 RX ADMIN — Medication 12 ML/HR: at 22:55

## 2021-02-23 RX ADMIN — SODIUM CHLORIDE, POTASSIUM CHLORIDE, SODIUM LACTATE AND CALCIUM CHLORIDE 1000 ML: 600; 310; 30; 20 INJECTION, SOLUTION INTRAVENOUS at 22:19

## 2021-02-23 RX ADMIN — ROPIVACAINE HYDROCHLORIDE 10 ML: 2 INJECTION, SOLUTION EPIDURAL; INFILTRATION at 22:54

## 2021-02-23 RX ADMIN — SODIUM CHLORIDE, POTASSIUM CHLORIDE, SODIUM LACTATE AND CALCIUM CHLORIDE: 600; 310; 30; 20 INJECTION, SOLUTION INTRAVENOUS at 14:35

## 2021-02-23 RX ADMIN — SODIUM CHLORIDE, POTASSIUM CHLORIDE, SODIUM LACTATE AND CALCIUM CHLORIDE: 600; 310; 30; 20 INJECTION, SOLUTION INTRAVENOUS at 23:09

## 2021-02-23 ASSESSMENT — MIFFLIN-ST. JEOR: SCORE: 1621.32

## 2021-02-23 NOTE — PLAN OF CARE
Patient arrives to the MAC from OGI to rule out preeclampsia due to elevated blood pressures.  Patient denies any signs or symptoms of preeclampsia.  Patient states that she is now leaking clear fluid.  The patient states she felt her water break at 1240 on her way from the clinic to here.  Patient states that she had her cervix checked today and is 2 cm.

## 2021-02-23 NOTE — PLAN OF CARE
Patient is admitted to room 214 for SROM at 1400 and Dr. Morales wants the patient to have pitocin to augment labor.  Report is given to Blanca Garcia RN

## 2021-02-23 NOTE — PLAN OF CARE
Patient admitted to L&D, leaking clear fluid.  Pt denies feeling contractions.  Plan is to start Pitocin augmentation; verbal consent obtained.

## 2021-02-23 NOTE — H&P
[facility] Labor and Delivery History and Physical    Janneth Mcclain MRN# [id number]   Age: 25 year old YOB: 1995     Date of Admission:  2021    Primary care provider: Zoraida Morales           HPI:   Janneth Mcclain is a 25 year old  at 39w0d  admitted for SROM.  Pregnancy c/b: Suspected nuchal cord, CPC seen on level II with normal QUAD.    Growth today with baby at 8 lbs 15 ounces at 92% and AC >99%.    Felt well today but BP elevated persistently in 140/60-80s. I checked her cervix and felt thin BBOW- SVE 2/50%/-3, posterior, moderate (had been one centimeter last week- felt lost mucus plug 2 days ago). She was sent to LDR for PIH rule out but then ruptured on the way to triage. Amnisure was positive. She is leaking clear fluid. No contractions.           Pregnancy history:     OBSTETRIC HISTORY:  OB History    Para Term  AB Living   3 0 0 0 2 0   SAB TAB Ectopic Multiple Live Births   2 0 0 0 0      # Outcome Date GA Lbr Bob/2nd Weight Sex Delivery Anes PTL Lv   3 Current            2 SAB 2020           1 SAB 2019               EDC: Estimated Date of Delivery: Mar 2, 2021    Complications:   Patient Active Problem List   Diagnosis     Indication for care in labor or delivery     Hypertension affecting pregnancy     Pregnancy       Prenatal Labs:   Lab Results   Component Value Date    ABO O 2020    RH Pos 2020    AS neg 2020    HEPBANG neg 2020    RUBELLAABIGG not immune 2020    HGB 13.0 2021       GBS Status:   Lab Results   Component Value Date    GBS neg 2021             Maternal Past Medical History:     Past Medical History:   Diagnosis Date     Depressive disorder     Therapist for anxiety currently     Past Surgical History:   Procedure Laterality Date     AS REMOVAL ADENOIDS,PRIMARY,<11 Y/O       DILATION AND CURETTAGE  2019; 3/2020      Medications Prior to Admission   Medication Sig Dispense  "Refill Last Dose     Prenatal-FeFum-FA-DHA w/o A (PRENATAL + DHA PO) Take 1 tablet by mouth daily   2021 at Unknown time           Family History:   The family history includes Pancreatic Cancer in her maternal grandfather.          Social History:     Social History     Tobacco Use     Smoking status: Never Smoker     Smokeless tobacco: Never Used   Substance Use Topics     Alcohol use: Not Currently     Frequency: 2-4 times a month            Review of Systems:   The Review of Systems is negative other than noted in the HPI          Physical Exam:     Patient Vitals for the past 8 hrs:   Height Weight   21 1304 1.6 m (5' 3\") 90.7 kg (200 lb)     Gen: NAD, well appearing  CV: Normal  Resp:Normal  Abd: gravid, NT  Ext: no edema, NT    Cervix: 2/50%/-3, post, mod  Membranes: ruptured clear  EFW: 9 lbs  Presentation:Cephalic    NST  Category 1    Contractions:occ        Assessment:   Janneth Mcclain is a 25 year old  at 39w0d admitted for SROM. Had slightly elevated BP in clinic but now normalized and with normal PIH labs (urine prot/creat not collected given ruptured fluid).         Plan:   1. Admit to LDR  2. Fetal wellbeing: Category 1  3. GBS negative  4. Pitocin to induce labor per protocol  5. Continuous EFM and [place]  6. General diet  7. Epidural/fentanyl prn pain  8. Watch closely for developing PIH/preeclampsia  9. Anticipate vaginal delivery    Zoraida Morales MD  2021  1:48 PM    "

## 2021-02-24 PROCEDURE — 722N000001 HC LABOR CARE VAGINAL DELIVERY SINGLE

## 2021-02-24 PROCEDURE — 3E033VJ INTRODUCTION OF OTHER HORMONE INTO PERIPHERAL VEIN, PERCUTANEOUS APPROACH: ICD-10-PCS | Performed by: OBSTETRICS & GYNECOLOGY

## 2021-02-24 PROCEDURE — 250N000013 HC RX MED GY IP 250 OP 250 PS 637: Performed by: OBSTETRICS & GYNECOLOGY

## 2021-02-24 PROCEDURE — 250N000009 HC RX 250: Performed by: OBSTETRICS & GYNECOLOGY

## 2021-02-24 PROCEDURE — 120N000012 HC R&B POSTPARTUM

## 2021-02-24 PROCEDURE — 3E0R3BZ INTRODUCTION OF ANESTHETIC AGENT INTO SPINAL CANAL, PERCUTANEOUS APPROACH: ICD-10-PCS | Performed by: SURGERY

## 2021-02-24 PROCEDURE — 0KQM0ZZ REPAIR PERINEUM MUSCLE, OPEN APPROACH: ICD-10-PCS | Performed by: OBSTETRICS & GYNECOLOGY

## 2021-02-24 PROCEDURE — 00HU33Z INSERTION OF INFUSION DEVICE INTO SPINAL CANAL, PERCUTANEOUS APPROACH: ICD-10-PCS | Performed by: SURGERY

## 2021-02-24 RX ORDER — TRANEXAMIC ACID 10 MG/ML
1 INJECTION, SOLUTION INTRAVENOUS EVERY 30 MIN PRN
Status: DISCONTINUED | OUTPATIENT
Start: 2021-02-24 | End: 2021-02-25 | Stop reason: HOSPADM

## 2021-02-24 RX ORDER — BISACODYL 10 MG
10 SUPPOSITORY, RECTAL RECTAL DAILY PRN
Status: DISCONTINUED | OUTPATIENT
Start: 2021-02-26 | End: 2021-02-25 | Stop reason: HOSPADM

## 2021-02-24 RX ORDER — AMOXICILLIN 250 MG
1 CAPSULE ORAL 2 TIMES DAILY
Status: DISCONTINUED | OUTPATIENT
Start: 2021-02-24 | End: 2021-02-25 | Stop reason: HOSPADM

## 2021-02-24 RX ORDER — IBUPROFEN 400 MG/1
800 TABLET, FILM COATED ORAL EVERY 6 HOURS PRN
Status: DISCONTINUED | OUTPATIENT
Start: 2021-02-24 | End: 2021-02-25 | Stop reason: HOSPADM

## 2021-02-24 RX ORDER — HYDROCORTISONE 2.5 %
CREAM (GRAM) TOPICAL 3 TIMES DAILY PRN
Status: DISCONTINUED | OUTPATIENT
Start: 2021-02-24 | End: 2021-02-25 | Stop reason: HOSPADM

## 2021-02-24 RX ORDER — OXYTOCIN/0.9 % SODIUM CHLORIDE 30/500 ML
100 PLASTIC BAG, INJECTION (ML) INTRAVENOUS CONTINUOUS
Status: DISCONTINUED | OUTPATIENT
Start: 2021-02-24 | End: 2021-02-25 | Stop reason: HOSPADM

## 2021-02-24 RX ORDER — MODIFIED LANOLIN
OINTMENT (GRAM) TOPICAL
Status: DISCONTINUED | OUTPATIENT
Start: 2021-02-24 | End: 2021-02-25 | Stop reason: HOSPADM

## 2021-02-24 RX ORDER — ACETAMINOPHEN 325 MG/1
650 TABLET ORAL EVERY 4 HOURS PRN
Status: DISCONTINUED | OUTPATIENT
Start: 2021-02-24 | End: 2021-02-25 | Stop reason: HOSPADM

## 2021-02-24 RX ORDER — OXYTOCIN/0.9 % SODIUM CHLORIDE 30/500 ML
340 PLASTIC BAG, INJECTION (ML) INTRAVENOUS CONTINUOUS PRN
Status: DISCONTINUED | OUTPATIENT
Start: 2021-02-24 | End: 2021-02-25 | Stop reason: HOSPADM

## 2021-02-24 RX ORDER — OXYTOCIN 10 [USP'U]/ML
10 INJECTION, SOLUTION INTRAMUSCULAR; INTRAVENOUS
Status: DISCONTINUED | OUTPATIENT
Start: 2021-02-24 | End: 2021-02-25 | Stop reason: HOSPADM

## 2021-02-24 RX ORDER — AMOXICILLIN 250 MG
2 CAPSULE ORAL 2 TIMES DAILY
Status: DISCONTINUED | OUTPATIENT
Start: 2021-02-24 | End: 2021-02-25 | Stop reason: HOSPADM

## 2021-02-24 RX ADMIN — ACETAMINOPHEN 650 MG: 325 TABLET, FILM COATED ORAL at 17:09

## 2021-02-24 RX ADMIN — IBUPROFEN 800 MG: 400 TABLET ORAL at 22:57

## 2021-02-24 RX ADMIN — ACETAMINOPHEN 650 MG: 325 TABLET, FILM COATED ORAL at 11:22

## 2021-02-24 RX ADMIN — DOCUSATE SODIUM 50 MG AND SENNOSIDES 8.6 MG 1 TABLET: 8.6; 5 TABLET, FILM COATED ORAL at 08:45

## 2021-02-24 RX ADMIN — IBUPROFEN 800 MG: 400 TABLET ORAL at 04:52

## 2021-02-24 RX ADMIN — ACETAMINOPHEN 650 MG: 325 TABLET, FILM COATED ORAL at 04:52

## 2021-02-24 RX ADMIN — Medication 340 ML/HR: at 03:51

## 2021-02-24 RX ADMIN — ACETAMINOPHEN 650 MG: 325 TABLET, FILM COATED ORAL at 22:57

## 2021-02-24 RX ADMIN — IBUPROFEN 800 MG: 400 TABLET ORAL at 11:22

## 2021-02-24 RX ADMIN — DOCUSATE SODIUM 50 MG AND SENNOSIDES 8.6 MG 1 TABLET: 8.6; 5 TABLET, FILM COATED ORAL at 20:15

## 2021-02-24 RX ADMIN — IBUPROFEN 800 MG: 400 TABLET ORAL at 17:09

## 2021-02-24 NOTE — L&D DELIVERY NOTE
DELIVERY SUMMARY:  Date: 2021     HISTORY:  Janneth Mcclain is a 25 year old  at 39w1d gestation. Prenatal course uncomplicated. GBS negative.  She is Rh positive and Rubella immune.      FIRST STAGE:  She presented to labor and delivery with spontaneous rupture of membranes at 1240.  Amniotic fluid was noted to be clear at the time of SROM.  Her cervix was 2cm dilated on admission. Pitocin was started for induction with a maximum rate of 14mU/min. She progressed to complete at 0140.  Epidural analgesia.    SECOND STAGE:   Fetal heart tones were reassuring during the second stage of labor.  She labored down for one hour and pushed for one hour. Head delivered OA over intact perineum at 0349.  No nuchal cord but the cord was very long and wrapped around the legs and body x2. Shoulders were delivered without difficulty and the remainder of the body followed.  The liveborn male infant was placed directly on the maternal abdomen and the infant was bulb suctioned.  Cord clamping was delayed 60 seconds after which the cord was clamped and cut.  Cord blood was obtained.  IV Pitocin was given through running IV.  Apgar 9 at 1 minute and 9 at 5 minutes.  Weight pending.    THIRD STAGE:  Pitocin was administered after delivery of the infant.  The placenta delivered spontaneously with gentle cord traction.  A second degree perineal laceration was repaired with 3-0 chromic suture in the usual fashion.  The uterus was noted to be firm.  Sponge and needle counts were correct.  Quantitated blood loss was 225 cc.  Mom and baby doing well and stable to transfer to postpartum recovery.    FINAL DELIVERY DIAGNOSIS:  1. Term pregnancy, delivered  2. PROM  3. Continuous lumbar epidural  4. 2nd degree perineal laceration, repaired    Maria T Harkins MD  Obstetrics, Gynecology, and Infertility

## 2021-02-24 NOTE — PLAN OF CARE
Data: Janneth Mcclain transferred to [facility] via wheelchair at 0610. Baby transferred via parent's arms.  Action: Receiving unit notified of transfer: Yes. Patient and family notified of room change. Report given to Swati WOLFE RN at 0610. Belongings sent to receiving unit. Accompanied by Registered Nurse. Oriented patient to surroundings. Call light within reach. ID bands double-checked with receiving RN.  Response: Patient tolerated transfer and is stable.

## 2021-02-24 NOTE — LACTATION NOTE
Initial visit with LAISHA Lagunas and baby.  Baby latched on well to the left breast.  Assisted with opening the jaw and flanging the bottom lip.    Breastfeeding general information reviewed.   Advised to breastfeed exclusively, on demand, avoid pacifiers, bottles and formula unless medically indicated.  Encouraged rooming in, skin to skin, feeding on demand 8-12x/day or sooner if baby cues.  Explained benefits of holding and skin to skin.  Encouraged lots of skin to skin. Instructed on hand expression.   Continues to nurse well per mom. No further questions at this time.   Will follow as needed. Has a breast pump for home.    Moira Abrams BSN, RN, PHN, RNC-MNN, IBCLC

## 2021-02-24 NOTE — PLAN OF CARE
Feels well. Vitals stable. Oral pain medications working. Breast feeding without difficulty. Will contonue to monitor.

## 2021-02-24 NOTE — ANESTHESIA PREPROCEDURE EVALUATION
Anesthesia Pre-Procedure Evaluation    Patient: Janneth Mcclain   MRN: [id number] : 1995        Preoperative Diagnosis: * No surgery found *   Procedure :      Past Medical History:   Diagnosis Date     Depressive disorder     Therapist for anxiety currently      Past Surgical History:   Procedure Laterality Date     AS REMOVAL ADENOIDS,PRIMARY,<13 Y/O       DILATION AND CURETTAGE  2019; 3/2020      Allergies   Allergen Reactions     Amoxicillin Rash      Social History     Tobacco Use     Smoking status: Never Smoker     Smokeless tobacco: Never Used   Substance Use Topics     Alcohol use: Not Currently     Frequency: 2-4 times a month      Wt Readings from Last 1 Encounters:   21 90.7 kg (200 lb)              OUTSIDE LABS:  CBC:   Lab Results   Component Value Date    WBC 12.7 (H) 2021    HGB 13.0 2021    HGB 13.1 2019    HCT 36.6 2021     2021     BMP:   Lab Results   Component Value Date    CR 0.52 2021    GLC 92 2019     COAGS: No results found for: PTT, INR, FIBR  POC:   Lab Results   Component Value Date    HCG Negative 10/17/2019     HEPATIC:   Lab Results   Component Value Date    ALT 21 2021    AST 17 2021     OTHER: No results found for: PH, LACT, A1C, LESLY, PHOS, MAG, LIPASE, AMYLASE, TSH, T4, T3, CRP, SED    Anesthesia Plan    ASA Status:  2      Anesthesia Type: Epidural.              Consents            Postoperative Care            Comments:                Anabel Anaya MD, MD

## 2021-02-24 NOTE — PLAN OF CARE
VSS. Small amount of vaginal bleeding, no clots. Pain controlled with ibuprofen and tylenol. Breastfeeding infant on demand, assistance provided.  at bedside, supportive and involved in cares. Will continue to monitor.

## 2021-02-25 VITALS
HEART RATE: 66 BPM | HEIGHT: 63 IN | WEIGHT: 200 LBS | RESPIRATION RATE: 16 BRPM | TEMPERATURE: 98.1 F | BODY MASS INDEX: 35.44 KG/M2 | SYSTOLIC BLOOD PRESSURE: 121 MMHG | DIASTOLIC BLOOD PRESSURE: 70 MMHG | OXYGEN SATURATION: 97 %

## 2021-02-25 LAB — HGB BLD-MCNC: 11.3 G/DL (ref 11.7–15.7)

## 2021-02-25 PROCEDURE — 250N000013 HC RX MED GY IP 250 OP 250 PS 637: Performed by: OBSTETRICS & GYNECOLOGY

## 2021-02-25 PROCEDURE — 85018 HEMOGLOBIN: CPT | Performed by: OBSTETRICS & GYNECOLOGY

## 2021-02-25 PROCEDURE — 36415 COLL VENOUS BLD VENIPUNCTURE: CPT | Performed by: OBSTETRICS & GYNECOLOGY

## 2021-02-25 RX ORDER — IBUPROFEN 800 MG/1
800 TABLET, FILM COATED ORAL EVERY 8 HOURS PRN
Qty: 30 TABLET | Refills: 0 | Status: SHIPPED | OUTPATIENT
Start: 2021-02-25 | End: 2021-06-17

## 2021-02-25 RX ORDER — AMOXICILLIN 250 MG
1 CAPSULE ORAL 2 TIMES DAILY PRN
Qty: 60 TABLET | Refills: 0 | Status: SHIPPED | OUTPATIENT
Start: 2021-02-25 | End: 2021-06-17

## 2021-02-25 RX ADMIN — IBUPROFEN 800 MG: 400 TABLET ORAL at 10:35

## 2021-02-25 RX ADMIN — ACETAMINOPHEN 650 MG: 325 TABLET, FILM COATED ORAL at 10:36

## 2021-02-25 RX ADMIN — IBUPROFEN 800 MG: 400 TABLET ORAL at 05:04

## 2021-02-25 RX ADMIN — ACETAMINOPHEN 650 MG: 325 TABLET, FILM COATED ORAL at 05:04

## 2021-02-25 RX ADMIN — DOCUSATE SODIUM 50 MG AND SENNOSIDES 8.6 MG 1 TABLET: 8.6; 5 TABLET, FILM COATED ORAL at 08:10

## 2021-02-25 NOTE — ANESTHESIA POSTPROCEDURE EVALUATION
Patient: Janneth Mcclain    * No procedures listed *    Diagnosis:* No pre-op diagnosis entered *  Diagnosis Additional Information: No value filed.    Anesthesia Type:  No value filed.    Note:  Disposition: Inpatient   Postop Pain Control: Uneventful            Sign Out: Well controlled pain   PONV: No   Neuro/Psych: Uneventful            Sign Out: Acceptable/Baseline neuro status   Airway/Respiratory: Uneventful            Sign Out: Acceptable/Baseline resp. status   CV/Hemodynamics: Uneventful            Sign Out: Acceptable CV status   Other NRE: NONE   DID A NON-ROUTINE EVENT OCCUR? No         Last vitals:  Vitals:    02/24/21 0831 02/24/21 1119 02/24/21 1530   BP: 126/65 123/69 130/82   Pulse: 75 70 75   Resp: 16 16 18   Temp: 37.1  C (98.8  F) 36.9  C (98.5  F) 36.8  C (98.2  F)   SpO2:          Last vitals prior to Anesthesia Care Transfer:      Electronically Signed By: Aleksandr Sanchez MD  February 24, 2021  6:27 PM

## 2021-02-25 NOTE — PLAN OF CARE
VSS. Voiding without difficulty. Scant vaginal bleeding, no clots. Pain controlled with ibuprofen and tylenol. Breastfeeding infant independently, infant breastfeeding well.  at bedside, supportive and involved in cares. Will continue to monitor.

## 2021-02-25 NOTE — PLAN OF CARE
Vitals stable. Feels well. Pain medications working well. Breast without difficulty. Ready for discharge home with baby.

## 2021-02-25 NOTE — PROGRESS NOTES
"OB PROGRESS NOTE    Postpartum Day 1: Vaginal Delivery    SUBJECTIVE  Feels well. Pain is well controlled with Tylenol and Ibuprofen.  She has no complaints.  She is urinating, tolerating a general diet and ambulating without difficulty.  Breast feeding is going well.  Lochia is moderate.  She denies emotional concerns.      EXAM  /61   Pulse 68   Temp 98.4  F (36.9  C) (Axillary)   Resp 16   Ht 1.6 m (5' 3\")   Wt 90.7 kg (200 lb)   LMP 05/23/2020   SpO2 97%   Breastfeeding Unknown   BMI 35.43 kg/m      GENERAL APPEARANCE:  normal affect  ABDOMEN: soft, nontender, fundus firm below the umbilicus    Recent Results (from the past 2016 hour(s))   Group B strep PCR    Collection Time: 02/02/21 12:00 AM   Result Value Ref Range    Group B Strep PCR neg    CBC with platelets    Collection Time: 02/23/21 12:52 PM   Result Value Ref Range    WBC 12.7 (H) 4.0 - 11.0 10e9/L    RBC Count 3.99 3.8 - 5.2 10e12/L    Hemoglobin 13.0 11.7 - 15.7 g/dL    Hematocrit 36.6 35.0 - 47.0 %    MCV 92 78 - 100 fl    MCH 32.6 26.5 - 33.0 pg    MCHC 35.5 31.5 - 36.5 g/dL    RDW 12.4 10.0 - 15.0 %    Platelet Count 246 150 - 450 10e9/L   AST    Collection Time: 02/23/21 12:52 PM   Result Value Ref Range    AST 17 0 - 45 U/L   ALT    Collection Time: 02/23/21 12:52 PM   Result Value Ref Range    ALT 21 0 - 50 U/L   Creatinine    Collection Time: 02/23/21 12:52 PM   Result Value Ref Range    Creatinine 0.52 0.52 - 1.04 mg/dL    GFR Estimate >90 >60 mL/min/[1.73_m2]    GFR Estimate If Black >90 >60 mL/min/[1.73_m2]   Uric acid    Collection Time: 02/23/21 12:52 PM   Result Value Ref Range    Uric Acid 4.3 2.6 - 6.0 mg/dL   Rupture of Fetal Membranes by ROM Plus    Collection Time: 02/23/21 12:52 PM   Result Value Ref Range    Rupture of Fetal Membranes by ROM Plus Positive (A) NEG^Negative   Treponema Abs w Reflex to RPR and Titer    Collection Time: 02/23/21 12:52 PM   Result Value Ref Range    Treponema Antibodies Nonreactive " NR^Nonreactive   ABO and Rh    Collection Time: 21 12:52 PM   Result Value Ref Range    ABO O     RH(D) Pos     Specimen Expires 2021    Asymptomatic SARS-CoV-2 COVID-19 Virus (Coronavirus) by PCR    Collection Time: 21  2:19 PM    Specimen: Nasopharyngeal   Result Value Ref Range    SARS-CoV-2 Virus Specimen Source Nasopharyngeal     SARS-CoV-2 PCR Result NEGATIVE     SARS-CoV-2 PCR Comment (Note)    ]    ASSESSMENT  Janneth Mcclain is a 25 year old  PPD# 1 s/p  at 39+ weeks gestation.  Doing well, uncomplicated course.    PLAN    1) Routine post-partum cares.  Encourage ambulation.  2) Rh+.  Rhogam not indicated  3) Pertussis vaccine to be given if indicated  4) Anticipatory guidance given.  5) Anticipate discharge today or tomorrow; discharge instructions and prescriptions written for today, but will cancel discharge if she decides to stay until tomorrow.      Nevin Croft MD  Obstetrics, Gynecology and Infertility

## 2021-02-25 NOTE — PLAN OF CARE
/82 (HR 75), pain adequately-controlled, and the pt is ambulating independently in the room.  She's voiding adequately and had one quarter-sized clot in the toilet; will continue to monitor.  She's attempting to breastfeed, but her son is sleepy and disinterested at the breast.  Skin to skin stimulation performed, will continue to assist

## 2021-02-28 ENCOUNTER — HOSPITAL ENCOUNTER (EMERGENCY)
Facility: CLINIC | Age: 26
Discharge: HOME | End: 2021-02-28
Attending: EMERGENCY MEDICINE | Admitting: EMERGENCY MEDICINE
Payer: COMMERCIAL

## 2021-02-28 ENCOUNTER — APPOINTMENT (OUTPATIENT)
Dept: CT IMAGING | Facility: CLINIC | Age: 26
End: 2021-02-28
Attending: EMERGENCY MEDICINE
Payer: COMMERCIAL

## 2021-02-28 VITALS
TEMPERATURE: 96.9 F | OXYGEN SATURATION: 96 % | RESPIRATION RATE: 21 BRPM | SYSTOLIC BLOOD PRESSURE: 137 MMHG | HEIGHT: 63 IN | DIASTOLIC BLOOD PRESSURE: 90 MMHG | BODY MASS INDEX: 34.91 KG/M2 | WEIGHT: 197 LBS | HEART RATE: 56 BPM

## 2021-02-28 DIAGNOSIS — R07.89 ATYPICAL CHEST PAIN: ICD-10-CM

## 2021-02-28 LAB
ALBUMIN SERPL-MCNC: 2.7 G/DL (ref 3.4–5)
ALP SERPL-CCNC: 113 U/L (ref 40–150)
ALT SERPL W P-5'-P-CCNC: 53 U/L (ref 0–50)
ANION GAP SERPL CALCULATED.3IONS-SCNC: 7 MMOL/L (ref 3–14)
AST SERPL W P-5'-P-CCNC: 37 U/L (ref 0–45)
BASOPHILS # BLD AUTO: 0.1 10E9/L (ref 0–0.2)
BASOPHILS NFR BLD AUTO: 0.5 %
BILIRUB SERPL-MCNC: 0.3 MG/DL (ref 0.2–1.3)
BUN SERPL-MCNC: 19 MG/DL (ref 7–30)
CALCIUM SERPL-MCNC: 8.9 MG/DL (ref 8.5–10.1)
CHLORIDE SERPL-SCNC: 108 MMOL/L (ref 94–109)
CO2 SERPL-SCNC: 26 MMOL/L (ref 20–32)
CREAT SERPL-MCNC: 0.69 MG/DL (ref 0.52–1.04)
D DIMER PPP FEU-MCNC: 1.1 UG/ML FEU (ref 0–0.5)
DIFFERENTIAL METHOD BLD: ABNORMAL
DIFFERENTIAL METHOD BLD: NORMAL
EOSINOPHIL # BLD AUTO: 0.4 10E9/L (ref 0–0.7)
EOSINOPHIL NFR BLD AUTO: 3.1 %
ERYTHROCYTE [DISTWIDTH] IN BLOOD BY AUTOMATED COUNT: 12.3 % (ref 10–15)
ERYTHROCYTE [DISTWIDTH] IN BLOOD BY AUTOMATED COUNT: NORMAL % (ref 10–15)
GFR SERPL CREATININE-BSD FRML MDRD: >90 ML/MIN/{1.73_M2}
GLUCOSE SERPL-MCNC: 82 MG/DL (ref 70–99)
HCT VFR BLD AUTO: 33.3 % (ref 35–47)
HCT VFR BLD AUTO: NORMAL % (ref 35–47)
HGB BLD-MCNC: 11.7 G/DL (ref 11.7–15.7)
HGB BLD-MCNC: NORMAL G/DL (ref 11.7–15.7)
IMM GRANULOCYTES # BLD: 0.1 10E9/L (ref 0–0.4)
IMM GRANULOCYTES NFR BLD: 1.1 %
INTERPRETATION ECG - MUSE: NORMAL
LIPASE SERPL-CCNC: 81 U/L (ref 73–393)
LYMPHOCYTES # BLD AUTO: 2.4 10E9/L (ref 0.8–5.3)
LYMPHOCYTES NFR BLD AUTO: 18.9 %
MCH RBC QN AUTO: 32.3 PG (ref 26.5–33)
MCH RBC QN AUTO: NORMAL PG (ref 26.5–33)
MCHC RBC AUTO-ENTMCNC: 35.1 G/DL (ref 31.5–36.5)
MCHC RBC AUTO-ENTMCNC: NORMAL G/DL (ref 31.5–36.5)
MCV RBC AUTO: 92 FL (ref 78–100)
MCV RBC AUTO: NORMAL FL (ref 78–100)
MONOCYTES # BLD AUTO: 0.8 10E9/L (ref 0–1.3)
MONOCYTES NFR BLD AUTO: 6.3 %
NEUTROPHILS # BLD AUTO: 9 10E9/L (ref 1.6–8.3)
NEUTROPHILS NFR BLD AUTO: 70.1 %
NRBC # BLD AUTO: 0 10*3/UL
NRBC BLD AUTO-RTO: 0 /100
PLATELET # BLD AUTO: 297 10E9/L (ref 150–450)
PLATELET # BLD AUTO: NORMAL 10E9/L (ref 150–450)
POTASSIUM SERPL-SCNC: 4 MMOL/L (ref 3.4–5.3)
PROT SERPL-MCNC: 6.3 G/DL (ref 6.8–8.8)
RBC # BLD AUTO: 3.62 10E12/L (ref 3.8–5.2)
RBC # BLD AUTO: NORMAL 10E12/L (ref 3.8–5.2)
SODIUM SERPL-SCNC: 141 MMOL/L (ref 133–144)
TROPONIN I SERPL-MCNC: <0.015 UG/L (ref 0–0.04)
WBC # BLD AUTO: 12.9 10E9/L (ref 4–11)
WBC # BLD AUTO: NORMAL 10E9/L (ref 4–11)

## 2021-02-28 PROCEDURE — 96374 THER/PROPH/DIAG INJ IV PUSH: CPT | Mod: 59

## 2021-02-28 PROCEDURE — 85379 FIBRIN DEGRADATION QUANT: CPT | Performed by: EMERGENCY MEDICINE

## 2021-02-28 PROCEDURE — 85025 COMPLETE CBC W/AUTO DIFF WBC: CPT | Performed by: EMERGENCY MEDICINE

## 2021-02-28 PROCEDURE — 99285 EMERGENCY DEPT VISIT HI MDM: CPT | Mod: 25

## 2021-02-28 PROCEDURE — 93005 ELECTROCARDIOGRAM TRACING: CPT

## 2021-02-28 PROCEDURE — 250N000011 HC RX IP 250 OP 636: Performed by: EMERGENCY MEDICINE

## 2021-02-28 PROCEDURE — 250N000009 HC RX 250: Performed by: EMERGENCY MEDICINE

## 2021-02-28 PROCEDURE — 71275 CT ANGIOGRAPHY CHEST: CPT

## 2021-02-28 PROCEDURE — 83690 ASSAY OF LIPASE: CPT | Performed by: EMERGENCY MEDICINE

## 2021-02-28 PROCEDURE — 250N000013 HC RX MED GY IP 250 OP 250 PS 637: Performed by: EMERGENCY MEDICINE

## 2021-02-28 PROCEDURE — 80053 COMPREHEN METABOLIC PANEL: CPT | Performed by: EMERGENCY MEDICINE

## 2021-02-28 PROCEDURE — 84484 ASSAY OF TROPONIN QUANT: CPT | Performed by: EMERGENCY MEDICINE

## 2021-02-28 RX ORDER — IOPAMIDOL 755 MG/ML
99 INJECTION, SOLUTION INTRAVASCULAR ONCE
Status: COMPLETED | OUTPATIENT
Start: 2021-02-28 | End: 2021-02-28

## 2021-02-28 RX ADMIN — FAMOTIDINE 20 MG: 10 INJECTION, SOLUTION INTRAVENOUS at 15:20

## 2021-02-28 RX ADMIN — SODIUM CHLORIDE 68 ML: 9 INJECTION, SOLUTION INTRAVENOUS at 16:18

## 2021-02-28 RX ADMIN — LIDOCAINE HYDROCHLORIDE 30 ML: 20 SOLUTION ORAL; TOPICAL at 15:24

## 2021-02-28 RX ADMIN — IOPAMIDOL 99 ML: 755 INJECTION, SOLUTION INTRAVENOUS at 16:18

## 2021-02-28 ASSESSMENT — ENCOUNTER SYMPTOMS
PALPITATIONS: 1
NERVOUS/ANXIOUS: 0
BACK PAIN: 0
SHORTNESS OF BREATH: 1

## 2021-02-28 ASSESSMENT — MIFFLIN-ST. JEOR: SCORE: 1607.72

## 2021-02-28 NOTE — DISCHARGE INSTRUCTIONS
I think your pain may be from acid reflux.  Eat a bland diet without anything greasy, spicy, or acidic.  Avoid cigarettes or alcohol.  Avoid caffeine and chocolate.  You may also try Tylenol rather than ibuprofen as ibuprofen can be hard on your stomach.  If you do take ibuprofen make sure you are taking it with food.  Start daily Prilosec.  Follow-up with your obstetrician or primary care provider.  Return immediately if you have worsening or change in pain or if you develop new concerns including, but not limited to, fever or vomiting.

## 2021-02-28 NOTE — ED TRIAGE NOTES
Pt gave birth 4 days ago. She states that she saw her OB on Friday for chest pain and was told if it didn't go ago to come to ED.

## 2021-02-28 NOTE — ED PROVIDER NOTES
History   Chief Complaint:  Chest Pain     The history is provided by the patient.      Janneth Mcclain is a 25 year old female who had an uncomplicated normal spontaneous vaginal delivery of her first child four days ago. She tells me she had some chest pain even while in the hospital which has persisted. She describes the pain as a central pressure sensation that is exacerbated by eating or breastfeeding, currently 1-2/10 in intensity. The pain does radiate across her upper abdomen and is associated with mild shortness of breath. She has not had relief with Tums and ibuprofen but has not started Prilosec recommended by her OB when she was seen for this issue 2 days ago. Janneth presents this afternoon because she had a brief episode of palpitations when she was breastfeeding this morning which concerned her. She wonders if her symptoms could be related to anxiety or hormones, but does not feel anxious. She denies back pain, jaw pain, or leg swelling. Her lochia is now less than a period.    Review of Systems   Constitutional: Negative for fever.   Respiratory: Positive for shortness of breath.    Cardiovascular: Positive for chest pain and palpitations. Negative for leg swelling.   Gastrointestinal: Positive for abdominal pain (chest pain radiates across epigastrium).   Genitourinary: Positive for vaginal bleeding (lochia).   Musculoskeletal: Negative for back pain.   Psychiatric/Behavioral: The patient is not nervous/anxious.    All other systems reviewed and are negative.    Allergies:  Amoxicillin    Medications:  Ibuprofen  Senna docusate  Prenatal Vitamin   Prilosec    Past Medical History:    Hypertension affecting pregnancy  Depressive disorder  Anxiety     Past Surgical History:    Adenoidectomy  D&C    Social History:  Presents alone.   Gave birth to first child four days ago.   Denies smoking.     Physical Exam     Patient Vitals for the past 24 hrs:   BP Temp Temp src Pulse Resp SpO2 Height Weight  "  02/28/21 1530 -- -- -- 60 19 99 % -- --   02/28/21 1527 (!) 164/92 -- -- 60 14 -- -- --   02/28/21 1425 130/73 96.9  F (36.1  C) Temporal 69 16 99 % 1.6 m (5' 3\") 89.4 kg (197 lb)       Physical Exam  General: Well-developed and well-nourished. Well appearing young  woman. Cooperative.  Head:  Atraumatic.  Eyes:  Conjunctivae, lids, and sclerae are normal.  Neck:  Supple. Normal range of motion.  CV:  Regular rate and rhythm. Normal heart sounds with no murmurs, rubs, or gallops detected.  Resp:  No respiratory distress. Clear to auscultation bilaterally without decreased breath sounds, wheezing, rales, or rhonchi.  GI:  Soft. Non-distended. Mild epigastric tenderness. Negative Smallwood's sign.     MS:  Normal ROM. No bilateral lower extremity edema or calf tenderness.  Skin:  Warm. Non-diaphoretic. No pallor.  Neuro:  Awake. A&Ox3. Normal strength.  Psych: Normal mood and affect. Normal speech.  Vitals reviewed.    Emergency Department Course   EKG  Indication: Chest pain and palpitations  Time: 1421  Rate 70 bpm. DE interval 196. QRS duration 72. QT/QTc 388/419.   Normal sinus rhythm  Normal ECG   No acute ST changes.  No prior EKG for comparison.     Imaging:  CT Chest (PE) Abdomen Pelvis w Contrast   Preliminary Result   IMPRESSION:      1. No pulmonary embolism demonstrated.   2. Mild soft tissue stranding in the region of the umbilicus, question   some cellulitis here.   3. Otherwise, no acute process demonstrated in the chest, abdomen, and   pelvis.        Laboratory:  CBC: WBC: 12.9(H), HGB: 11.7, PLT: 297  CMP: Glucose 82, albumin 2.7 (L), protein total 6.3 (L), ALT 53 (H), o/w WNL (Creatinine: 0.69)  D dimer quantitative: 1.1(H)  Lipase: 81  Troponin (Collected ): <0.015    Emergency Department Course:    Reviewed:  I reviewed nursing notes, vitals, and past medical history.    Assessments:  1436 I obtained history and examined the patient as noted above.   1657 I rechecked the patient and " explained findings. She is feeling about the same.      Interventions:  1520 Pepid 20 mg IV  1524 Maalox 30 mL Oral    Disposition:  Discharged.    Impression & Plan   Medical Decision Making:  Janneth is a 25-year-old woman who is 4 days postpartum.  She presents with chest pain that she has had for the last several days, not relieved by ibuprofen or Tums.  She had brief episode of palpitations when she was breast-feeding this morning which prompted her visit.  She denies other concerns and appears well on exam.  She does have mild epigastric tenderness without significant right upper quadrant tenderness and a negative Smallwood's sign.  EKG is reassuring without acute ST changes or arrhythmias and troponin is undetectable.  Given pain for several days, this essentially rules out ACS.  However, her D-dimer was elevated at 1.1 so she was sent for CT angiogram of the chest.  Because she does have some epigastric tenderness, I also included the abdomen and pelvis.  Fortunately, she has no pulmonary embolism or other acute processes in the chest, abdomen, or pelvis including no calcified gallstones or bile duct dilatation.  Notably there is question of cellulitis given soft tissue stranding at the region of the umbilicus.  I reevaluated this area and there is no superficial findings including no erythema.  She denies pain or tenderness here.  She has mild elevation in ALT which is unlikely to be clinically relevant.  There is no pancreatitis or evidence of biliary obstruction.  There is no kidney injury or electrolyte derangements.  Mild leukocytosis is similar to recent value and likely related to her recent delivery.  She was given a GI cocktail and Pepcid and on repeat evaluation is feeling about the same.  My suspicion is her pain is multifactorial.  Because she does have epigastric tenderness with pain worsening after eating, a component of this is likely gastritis/GERD.  I recommended she eat a very bland diet and  try to use Tylenol rather than ibuprofen.  She has already been told by OB to start Prilosec which she agrees to do going forward.  However, some of her pain may be related to breastfeeding and her first experience post-partum.  I recommended she follow-up with her obstetrician or primary care provider although we discussed indications to return in detail.  All questions answered.  Amenable to discharge.      Covid-19  Janneth Mcclain was evaluated during a global COVID-19 pandemic, which necessitated consideration that the patient might be at risk for infection with the SARS-CoV-2 virus that causes COVID-19.   Applicable protocols for evaluation were followed during the patient's care.   COVID-19 was considered as part of the patient's evaluation. The plan for testing is:  a test was obtained at a previous visit (2/23/21) and reviewed & considered today.    Diagnosis:    ICD-10-CM    1. Atypical chest pain  R07.89        Discharge Medications:  Discharge Medication List as of 2/28/2021  5:02 PM          Scribe Disclosure:  I, Sosa Loco, am serving as a scribe at 2:37 PM on 2/28/2021 to document services personally performed by Rosaura Sage MD based on my observations and the provider's statements to me.            Rosaura Sage MD  03/01/21 153

## 2021-03-01 ASSESSMENT — ENCOUNTER SYMPTOMS
FEVER: 0
ABDOMINAL PAIN: 1

## 2021-06-17 ENCOUNTER — TELEPHONE (OUTPATIENT)
Dept: FAMILY MEDICINE | Facility: CLINIC | Age: 26
End: 2021-06-17

## 2021-06-17 ENCOUNTER — OFFICE VISIT (OUTPATIENT)
Dept: FAMILY MEDICINE | Facility: CLINIC | Age: 26
End: 2021-06-17
Payer: COMMERCIAL

## 2021-06-17 VITALS
DIASTOLIC BLOOD PRESSURE: 62 MMHG | TEMPERATURE: 96.9 F | OXYGEN SATURATION: 99 % | HEART RATE: 60 BPM | WEIGHT: 171 LBS | SYSTOLIC BLOOD PRESSURE: 102 MMHG | BODY MASS INDEX: 30.29 KG/M2

## 2021-06-17 DIAGNOSIS — R74.8 ELEVATED LIPASE: ICD-10-CM

## 2021-06-17 DIAGNOSIS — Z00.00 ROUTINE GENERAL MEDICAL EXAMINATION AT A HEALTH CARE FACILITY: Primary | ICD-10-CM

## 2021-06-17 DIAGNOSIS — Z11.59 NEED FOR HEPATITIS C SCREENING TEST: ICD-10-CM

## 2021-06-17 LAB
ALBUMIN SERPL-MCNC: 4 G/DL (ref 3.4–5)
ALP SERPL-CCNC: 94 U/L (ref 40–150)
ALT SERPL W P-5'-P-CCNC: 20 U/L (ref 0–50)
ANION GAP SERPL CALCULATED.3IONS-SCNC: <1 MMOL/L (ref 3–14)
AST SERPL W P-5'-P-CCNC: 13 U/L (ref 0–45)
BILIRUB SERPL-MCNC: 0.8 MG/DL (ref 0.2–1.3)
BUN SERPL-MCNC: 14 MG/DL (ref 7–30)
CALCIUM SERPL-MCNC: 9.2 MG/DL (ref 8.5–10.1)
CHLORIDE SERPL-SCNC: 107 MMOL/L (ref 94–109)
CHOLEST SERPL-MCNC: 176 MG/DL
CO2 SERPL-SCNC: 31 MMOL/L (ref 20–32)
CREAT SERPL-MCNC: 0.78 MG/DL (ref 0.52–1.04)
ERYTHROCYTE [DISTWIDTH] IN BLOOD BY AUTOMATED COUNT: 11.9 % (ref 10–15)
GFR SERPL CREATININE-BSD FRML MDRD: >90 ML/MIN/{1.73_M2}
GLUCOSE SERPL-MCNC: 78 MG/DL (ref 70–99)
HCT VFR BLD AUTO: 36.6 % (ref 35–47)
HDLC SERPL-MCNC: 80 MG/DL
HGB BLD-MCNC: 13.2 G/DL (ref 11.7–15.7)
LDLC SERPL CALC-MCNC: 89 MG/DL
LIPASE SERPL-CCNC: 42 U/L (ref 73–393)
MCH RBC QN AUTO: 31.7 PG (ref 26.5–33)
MCHC RBC AUTO-ENTMCNC: 36.1 G/DL (ref 31.5–36.5)
MCV RBC AUTO: 88 FL (ref 78–100)
NONHDLC SERPL-MCNC: 96 MG/DL
PLATELET # BLD AUTO: 287 10E9/L (ref 150–450)
POTASSIUM SERPL-SCNC: 4.2 MMOL/L (ref 3.4–5.3)
PROT SERPL-MCNC: 8.1 G/DL (ref 6.8–8.8)
RBC # BLD AUTO: 4.16 10E12/L (ref 3.8–5.2)
SODIUM SERPL-SCNC: 138 MMOL/L (ref 133–144)
TRIGL SERPL-MCNC: 37 MG/DL
WBC # BLD AUTO: 7.4 10E9/L (ref 4–11)

## 2021-06-17 PROCEDURE — 83690 ASSAY OF LIPASE: CPT | Performed by: FAMILY MEDICINE

## 2021-06-17 PROCEDURE — 86803 HEPATITIS C AB TEST: CPT | Performed by: FAMILY MEDICINE

## 2021-06-17 PROCEDURE — 80061 LIPID PANEL: CPT | Performed by: FAMILY MEDICINE

## 2021-06-17 PROCEDURE — 36415 COLL VENOUS BLD VENIPUNCTURE: CPT | Performed by: FAMILY MEDICINE

## 2021-06-17 PROCEDURE — 80053 COMPREHEN METABOLIC PANEL: CPT | Performed by: FAMILY MEDICINE

## 2021-06-17 PROCEDURE — 99395 PREV VISIT EST AGE 18-39: CPT | Performed by: FAMILY MEDICINE

## 2021-06-17 PROCEDURE — 85027 COMPLETE CBC AUTOMATED: CPT | Performed by: FAMILY MEDICINE

## 2021-06-17 RX ORDER — CHLORAL HYDRATE 500 MG
2 CAPSULE ORAL DAILY
COMMUNITY

## 2021-06-17 RX ORDER — ERGOCALCIFEROL 1.25 MG/1
50000 CAPSULE, LIQUID FILLED ORAL WEEKLY
COMMUNITY

## 2021-06-17 ASSESSMENT — PAIN SCALES - GENERAL: PAINLEVEL: NO PAIN (0)

## 2021-06-17 NOTE — PROGRESS NOTES
SUBJECTIVE:   CC: Janneth Mcclain is an 25 year old woman who presents for preventive health visit.       Patient has been advised of split billing requirements and indicates understanding: Yes  Healthy Habits:    Do you get at least three servings of calcium containing foods daily (dairy, green leafy vegetables, etc.)? yes    Amount of exercise or daily activities, outside of work: one day a week     Problems taking medications regularly not applicable    Medication side effects: No    Have you had an eye exam in the past two years? no    Do you see a dentist twice per year? yes    Do you have sleep apnea, excessive snoring or daytime drowsiness?no          Today's PHQ-2 Score:   PHQ-2 ( 1999 Pfizer) 6/17/2021 3/12/2019   Q1: Little interest or pleasure in doing things 0 0   Q2: Feeling down, depressed or hopeless 0 0   PHQ-2 Score 0 0       Abuse: Current or Past(Physical, Sexual or Emotional)- No  Do you feel safe in your environment? Yes    Have you ever done Advance Care Planning? (For example, a Health Directive, POLST, or a discussion with a medical provider or your loved ones about your wishes): Yes, patient states has an Advance Care Planning document and will bring a copy to the clinic.    Social History     Tobacco Use     Smoking status: Never Smoker     Smokeless tobacco: Never Used   Substance Use Topics     Alcohol use: Not Currently     Frequency: 2-4 times a month     If you drink alcohol do you typically have >3 drinks per day or >7 drinks per week? No                     Reviewed orders with patient.  Reviewed health maintenance and updated orders accordingly - Yes  BP Readings from Last 3 Encounters:   06/17/21 102/62   02/28/21 (!) 137/90   02/25/21 121/70    Wt Readings from Last 3 Encounters:   06/17/21 77.6 kg (171 lb)   02/28/21 89.4 kg (197 lb)   02/23/21 90.7 kg (200 lb)                  Patient Active Problem List   Diagnosis     Indication for care in labor or delivery      Hypertension affecting pregnancy     Pregnancy     Past Surgical History:   Procedure Laterality Date     AS REMOVAL ADENOIDS,PRIMARY,<11 Y/O       DILATION AND CURETTAGE  12/2019; 3/2020       Social History     Tobacco Use     Smoking status: Never Smoker     Smokeless tobacco: Never Used   Substance Use Topics     Alcohol use: Not Currently     Frequency: 2-4 times a month     Family History   Problem Relation Age of Onset     Pancreatic Cancer Maternal Grandfather      Skin Cancer No family hx of         unknown relative         Current Outpatient Medications   Medication Sig Dispense Refill     fish oil-omega-3 fatty acids 1000 MG capsule Take 2 g by mouth daily       Prenatal-FeFum-FA-DHA w/o A (PRENATAL + DHA PO) Take 1 tablet by mouth daily       vitamin B-Complex Take 1 tablet by mouth daily       vitamin D2 (ERGOCALCIFEROL) 85029 units (1250 mcg) capsule Take 50,000 Units by mouth once a week       Allergies   Allergen Reactions     Amoxicillin Rash     Recent Labs   Lab Test 02/28/21  1527 02/23/21  1252   ALT 53* 21   CR 0.69 0.52   GFRESTIMATED >90 >90   GFRESTBLACK >90 >90   POTASSIUM 4.0  --         FSH-7: No flowsheet data found.  click delete button to remove this line now  Patient under 40 years of age: Routine Mammogram Screening not recommended.   Pertinent mammograms are reviewed under the imaging tab.    Pertinent mammograms are reviewed under the imaging tab.  History of abnormal Pap smear: NO - age 30- 65 PAP every 3 years recommended     Reviewed and updated as needed this visit by clinical staff  Tobacco  Allergies  Meds              Reviewed and updated as needed this visit by Provider                Past Medical History:   Diagnosis Date     Depressive disorder     Therapist for anxiety currently      Past Surgical History:   Procedure Laterality Date     AS REMOVAL ADENOIDS,PRIMARY,<11 Y/O       DILATION AND CURETTAGE  12/2019; 3/2020       ROS:  CONSTITUTIONAL: NEGATIVE for fever,  chills, change in weight  INTEGUMENTARU/SKIN: NEGATIVE for worrisome rashes, moles or lesions  EYES: NEGATIVE for vision changes or irritation  ENT: NEGATIVE for ear, mouth and throat problems  RESP: NEGATIVE for significant cough or SOB  BREAST: NEGATIVE for masses, tenderness or discharge  CV: NEGATIVE for chest pain, palpitations or peripheral edema  GI: NEGATIVE for nausea, abdominal pain, heartburn, or change in bowel habits  : NEGATIVE for unusual urinary or vaginal symptoms. Periods are regular.  MUSCULOSKELETAL: NEGATIVE for significant arthralgias or myalgia  NEURO: NEGATIVE for weakness, dizziness or paresthesias  PSYCHIATRIC: NEGATIVE for changes in mood or affect    OBJECTIVE:   /62   Pulse 60   Temp 96.9  F (36.1  C) (Tympanic)   Wt 77.6 kg (171 lb)   SpO2 99%   BMI 30.29 kg/m    EXAM:  GENERAL: healthy, alert and no distress  EYES: Eyes grossly normal to inspection, PERRL and conjunctivae and sclerae normal  HENT: ear canals and TM's normal, nose and mouth without ulcers or lesions  NECK: no adenopathy, no asymmetry, masses, or scars and thyroid normal to palpation  RESP: lungs clear to auscultation - no rales, rhonchi or wheezes  CV: regular rate and rhythm, normal S1 S2, no S3 or S4, no murmur, click or rub, no peripheral edema and peripheral pulses strong  ABDOMEN: soft, nontender, no hepatosplenomegaly, no masses and bowel sounds normal  MS: no gross musculoskeletal defects noted, no edema  SKIN: no suspicious lesions or rashes  NEURO: Normal strength and tone, mentation intact and speech normal  BACK: no CVA tenderness, no paralumbar tenderness  PSYCH: mentation appears normal, affect normal/bright  LYMPH: no cervical, supraclavicular, axillary, or inguinal adenopathy        ASSESSMENT/PLAN:       ICD-10-CM    1. Routine general medical examination at a health care facility  Z00.00 Hepatitis C Screen Reflex to HCV RNA Quant and Genotype     CBC with platelets     Comprehensive  "metabolic panel (BMP + Alb, Alk Phos, ALT, AST, Total. Bili, TP)     Lipid panel reflex to direct LDL Fasting     Lipase   2. Need for hepatitis C screening test  Z11.59 Hepatitis C Screen Reflex to HCV RNA Quant and Genotype   3. Elevated lipase  R74.8 Lipase       Patient has been advised of split billing requirements and indicates understanding: Yes  COUNSELING:   Reviewed preventive health counseling, as reflected in patient instructions    Estimated body mass index is 30.29 kg/m  as calculated from the following:    Height as of 2/28/21: 1.6 m (5' 3\").    Weight as of this encounter: 77.6 kg (171 lb).        She reports that she has never smoked. She has never used smokeless tobacco.      Counseling Resources:  ATP IV Guidelines  Pooled Cohorts Equation Calculator  Breast Cancer Risk Calculator  BRCA-Related Cancer Risk Assessment: FHS-7 Tool  FRAX Risk Assessment  ICSI Preventive Guidelines  Dietary Guidelines for Americans, 2010  [employer] MyPlate  ASA Prophylaxis  Lung CA Screening    Khurram aHrrison MD  [facility]  "

## 2021-06-18 LAB — HCV AB SERPL QL IA: NONREACTIVE

## 2021-06-21 ENCOUNTER — NURSE TRIAGE (OUTPATIENT)
Dept: NURSING | Facility: CLINIC | Age: 26
End: 2021-06-21

## 2021-06-21 DIAGNOSIS — K62.5 HEMORRHAGE OF RECTUM AND ANUS: Primary | ICD-10-CM

## 2021-06-21 RX ORDER — HYDROCORTISONE 25 MG/G
CREAM TOPICAL 2 TIMES DAILY PRN
Qty: 30 G | Refills: 0 | Status: SHIPPED | OUTPATIENT
Start: 2021-06-21 | End: 2022-11-22

## 2021-06-21 NOTE — TELEPHONE ENCOUNTER
Patient Contact    Called patient and relayed provider message. Questions if it is safe for breastfeeding. RN reviewed micromedix and advised to discuss further with pharmacist if she has additional questions. She verbalizes understanding.

## 2021-06-21 NOTE — TELEPHONE ENCOUNTER
Patient calls with concerns regarding constipation she has been having since childbirth 4 months ago. Patient was seen in clinic 6/17 and did discuss symptoms with the provider. Patient reports that Friday she was trying to have a bowel movement, only passed small harder stools, but when she wiped there was a significant amount of blood and the water in the toilet was red. Patient did not have any bleeding after that, has had two bowel movements since that were softer and no bleeding present. Patient has been increasing her water intake and eating more fiber.     Per protocol patient should be seen in clinic today. Will send message to provider for recommendations since patient was just seen.     Please review and advise if patient should be seen at this time or if there are any further recommendations.    Viviane Eastman RN  [facility]      Reason for Disposition    MODERATE rectal bleeding (small blood clots, passing blood without stool, or toilet water turns red)    Additional Information    Negative: Passed out (i.e., fainted, collapsed and was not responding)    Negative: Shock suspected (e.g., cold/pale/clammy skin, too weak to stand, low BP, rapid pulse)    Negative: Vomiting red blood or black (coffee ground) material    Negative: Sounds like a life-threatening emergency to the triager    Negative: Diarrhea is the main symptom    Negative: Rectal symptoms    Negative: SEVERE rectal bleeding (large blood clots; on and off, or constant bleeding)    Negative: SEVERE dizziness (e.g., unable to stand, requires support to walk, feels like passing out now)    Negative: MODERATE rectal bleeding (small blood clots, passing blood without stool, or toilet water turns red) more than once a day    Negative: Bloody, black, or tarry bowel movements (Exception: chronic-unchanged  black-grey bowel movements and is taking iron pills or Pepto-bismol)    Negative: High-risk adult (e.g., prior surgery on  aorta, abdominal aortic aneurysm)    Negative: Rectal foreign body (inserted or swallowed)    Negative: SEVERE abdominal pain (e.g., excruciating)    Negative: Constant abdominal pain lasting > 2 hours    Negative: Pale skin (pallor) of new onset or worsening    Negative: Patient sounds very sick or weak to the triager    Protocols used: RECTAL BLEEDING-A-[place]  COVID 19 Nurse Triage Plan/Patient Instructions    Please be aware that novel coronavirus (COVID-19) may be circulating in the community. If you develop symptoms such as fever, cough, or SOB or if you have concerns about the presence of another infection including coronavirus (COVID-19), please contact your health care provider or visit [email or web address].     Disposition/Instructions    In-Person Visit with provider recommended. Reference Visit Selection Guide.    Thank you for taking steps to prevent the spread of this virus.  o Limit your contact with others.  o Wear a simple mask to cover your cough.  o Wash your hands well and often.    Resources    [facility]: About COVID-19: [email or web address]    CDC: What to Do If You're Sick: www.cdc.gov/coronavirus/2019-ncov/about/steps-when-sick.html    CDC: Ending Home Isolation: www.cdc.gov/coronavirus/2019-ncov/hcp/disposition-in-home-patients.html     CDC: Caring for Someone: www.cdc.gov/coronavirus/2019-ncov/if-you-are-sick/care-for-someone.html     [facility]: Interim Guidance for Hospital Discharge to Home: www.health.[facility].[place]/diseases/coronavirus/hcp/hospdischarge.pdf    [facility] clinical trials (COVID-19 research studies): [email or web address]-clinical-trials     Below are the COVID-19 hotlines at the [place] [facility] ([facility]). Interpreters are available.   o For health questions: Call 261-508-9192 or 1-388.888.4194 (7 a.m. to 7 p.m.)  o For questions about schools and childcare: Call 785-282-6028 or 1-142.862.2200 (7 a.m. to 7 p.m.)

## 2021-06-21 NOTE — TELEPHONE ENCOUNTER
Based on the history and clinical finding, she potentially has hemorrhoid rupture or anal fissure. I will have her to try topical anal cream for next 1 week. And, referral to colorectal surgery might be considered if rectal bleeding and/or pain not improving with it. Please encourage her to contact us next Monday. The prescription was sent to pharmacy      thx

## 2021-10-10 ENCOUNTER — HEALTH MAINTENANCE LETTER (OUTPATIENT)
Age: 26
End: 2021-10-10

## 2022-01-18 LAB
HEPATITIS B SURFACE ANTIGEN (EXTERNAL): NEGATIVE
HIV1+2 AB SERPL QL IA: NONREACTIVE
RUBELLA ANTIBODY IGG (EXTERNAL): NORMAL
TREPONEMA PALLIDUM ANTIBODY (EXTERNAL): NONREACTIVE

## 2022-02-23 ENCOUNTER — MEDICAL CORRESPONDENCE (OUTPATIENT)
Dept: HEALTH INFORMATION MANAGEMENT | Facility: CLINIC | Age: 27
End: 2022-02-23
Payer: COMMERCIAL

## 2022-02-23 ENCOUNTER — TRANSFERRED RECORDS (OUTPATIENT)
Dept: HEALTH INFORMATION MANAGEMENT | Facility: CLINIC | Age: 27
End: 2022-02-23
Payer: COMMERCIAL

## 2022-02-23 ENCOUNTER — TRANSCRIBE ORDERS (OUTPATIENT)
Dept: MATERNAL FETAL MEDICINE | Facility: CLINIC | Age: 27
End: 2022-02-23
Payer: COMMERCIAL

## 2022-02-23 DIAGNOSIS — O26.90 PREGNANCY RELATED CONDITION, ANTEPARTUM: Primary | ICD-10-CM

## 2022-04-21 ENCOUNTER — PRE VISIT (OUTPATIENT)
Dept: MATERNAL FETAL MEDICINE | Facility: CLINIC | Age: 27
End: 2022-04-21
Payer: COMMERCIAL

## 2022-04-28 ENCOUNTER — HOSPITAL ENCOUNTER (OUTPATIENT)
Dept: ULTRASOUND IMAGING | Facility: CLINIC | Age: 27
Discharge: HOME | End: 2022-04-28
Attending: NURSE PRACTITIONER
Payer: COMMERCIAL

## 2022-04-28 ENCOUNTER — OFFICE VISIT (OUTPATIENT)
Dept: MATERNAL FETAL MEDICINE | Facility: CLINIC | Age: 27
End: 2022-04-28
Attending: NURSE PRACTITIONER
Payer: COMMERCIAL

## 2022-04-28 DIAGNOSIS — O44.42 LOW LYING PLACENTA NOS OR WITHOUT HEMORRHAGE, SECOND TRIMESTER: ICD-10-CM

## 2022-04-28 DIAGNOSIS — O26.90 PREGNANCY RELATED CONDITION, ANTEPARTUM: ICD-10-CM

## 2022-04-28 DIAGNOSIS — Z82.79 FAMILY HISTORY OF BICUSPID AORTIC VALVE: Primary | ICD-10-CM

## 2022-04-28 PROCEDURE — 76811 OB US DETAILED SNGL FETUS: CPT

## 2022-04-28 PROCEDURE — 76811 OB US DETAILED SNGL FETUS: CPT | Mod: 26 | Performed by: OBSTETRICS & GYNECOLOGY

## 2022-04-28 NOTE — PROGRESS NOTES
Please see full imaging report from [employer] program under imaging tab.    Luis Vaca MD  Maternal Fetal Medicine

## 2022-06-02 ENCOUNTER — HOSPITAL ENCOUNTER (OUTPATIENT)
Dept: ULTRASOUND IMAGING | Facility: CLINIC | Age: 27
Discharge: HOME | End: 2022-06-02
Attending: NURSE PRACTITIONER
Payer: COMMERCIAL

## 2022-06-02 ENCOUNTER — OFFICE VISIT (OUTPATIENT)
Dept: MATERNAL FETAL MEDICINE | Facility: CLINIC | Age: 27
End: 2022-06-02
Attending: NURSE PRACTITIONER
Payer: COMMERCIAL

## 2022-06-02 DIAGNOSIS — Z82.79 FAMILY HISTORY OF BICUSPID AORTIC VALVE: ICD-10-CM

## 2022-06-02 DIAGNOSIS — O26.90 PREGNANCY RELATED CONDITION, ANTEPARTUM: ICD-10-CM

## 2022-06-02 DIAGNOSIS — O44.42 LOW LYING PLACENTA NOS OR WITHOUT HEMORRHAGE, SECOND TRIMESTER: ICD-10-CM

## 2022-06-02 DIAGNOSIS — O26.90 PREGNANCY RELATED CONDITION, ANTEPARTUM: Primary | ICD-10-CM

## 2022-06-02 PROCEDURE — 76827 ECHO EXAM OF FETAL HEART: CPT | Mod: 26 | Performed by: OBSTETRICS & GYNECOLOGY

## 2022-06-02 PROCEDURE — 76825 ECHO EXAM OF FETAL HEART: CPT | Mod: 26 | Performed by: OBSTETRICS & GYNECOLOGY

## 2022-06-02 PROCEDURE — 93325 DOPPLER ECHO COLOR FLOW MAPG: CPT | Mod: 26 | Performed by: OBSTETRICS & GYNECOLOGY

## 2022-06-02 PROCEDURE — 93325 DOPPLER ECHO COLOR FLOW MAPG: CPT

## 2022-06-02 NOTE — PROGRESS NOTES
The patient was seen for an ultrasound in the Maternal[facility]Fetal Medicine Center at the [facility] today.  For a detailed report of the ultrasound examination, please see the ultrasound report which can be found under the imaging tab.    Opal Granados MD  , OB/GYN  Maternal-Fetal Medicine  [phone] (Pager)

## 2022-08-30 LAB — GROUP B STREPTOCOCCUS (EXTERNAL): NEGATIVE

## 2022-09-01 ENCOUNTER — HOSPITAL ENCOUNTER (OUTPATIENT)
Facility: CLINIC | Age: 27
End: 2022-09-01
Admitting: OBSTETRICS & GYNECOLOGY
Payer: COMMERCIAL

## 2022-09-01 ENCOUNTER — HOSPITAL ENCOUNTER (OUTPATIENT)
Facility: CLINIC | Age: 27
Discharge: HOME | End: 2022-09-01
Attending: OBSTETRICS & GYNECOLOGY | Admitting: OBSTETRICS & GYNECOLOGY
Payer: COMMERCIAL

## 2022-09-01 VITALS
SYSTOLIC BLOOD PRESSURE: 115 MMHG | HEART RATE: 82 BPM | RESPIRATION RATE: 16 BRPM | HEIGHT: 63 IN | WEIGHT: 208 LBS | DIASTOLIC BLOOD PRESSURE: 63 MMHG | TEMPERATURE: 98 F | BODY MASS INDEX: 36.86 KG/M2

## 2022-09-01 PROCEDURE — 59025 FETAL NON-STRESS TEST: CPT

## 2022-09-01 PROCEDURE — G0463 HOSPITAL OUTPT CLINIC VISIT: HCPCS | Mod: 25

## 2022-09-01 RX ORDER — METOCLOPRAMIDE HYDROCHLORIDE 5 MG/ML
10 INJECTION INTRAMUSCULAR; INTRAVENOUS EVERY 6 HOURS PRN
Status: DISCONTINUED | OUTPATIENT
Start: 2022-09-01 | End: 2022-09-01 | Stop reason: HOSPADM

## 2022-09-01 RX ORDER — ONDANSETRON 4 MG/1
4 TABLET, ORALLY DISINTEGRATING ORAL EVERY 6 HOURS PRN
Status: DISCONTINUED | OUTPATIENT
Start: 2022-09-01 | End: 2022-09-01 | Stop reason: HOSPADM

## 2022-09-01 RX ORDER — ONDANSETRON 2 MG/ML
4 INJECTION INTRAMUSCULAR; INTRAVENOUS EVERY 6 HOURS PRN
Status: DISCONTINUED | OUTPATIENT
Start: 2022-09-01 | End: 2022-09-01 | Stop reason: HOSPADM

## 2022-09-01 RX ORDER — METOCLOPRAMIDE 10 MG/1
10 TABLET ORAL EVERY 6 HOURS PRN
Status: DISCONTINUED | OUTPATIENT
Start: 2022-09-01 | End: 2022-09-01 | Stop reason: HOSPADM

## 2022-09-01 RX ORDER — PROCHLORPERAZINE 25 MG
25 SUPPOSITORY, RECTAL RECTAL EVERY 12 HOURS PRN
Status: DISCONTINUED | OUTPATIENT
Start: 2022-09-01 | End: 2022-09-01 | Stop reason: HOSPADM

## 2022-09-01 RX ORDER — PROCHLORPERAZINE MALEATE 5 MG
10 TABLET ORAL EVERY 6 HOURS PRN
Status: DISCONTINUED | OUTPATIENT
Start: 2022-09-01 | End: 2022-09-01 | Stop reason: HOSPADM

## 2022-09-01 NOTE — DISCHARGE INSTRUCTIONS
Discharge Instruction for Undelivered Patients      You were seen for:  Fall  We Consulted: Dr. Croft  You had (Test or Medicine): Non-Stress test     Diet:   Drink 8 to 12 glasses of liquids (milk, juice, water) every day.  You may eat meals and snacks.     Activity:  Call your doctor or nurse midwife if your baby is moving less than usual.     Call your provider if you notice:  Swelling in your face or increased swelling in your hands or legs.  Headaches that are not relieved by Tylenol (acetaminophen).  Changes in your vision (blurring: seeing spots or stars.)  Nausea (sick to your stomach) and vomiting (throwing up).   Weight gain of 5 pounds or more per week.  Heartburn that doesn't go away.  Signs of bladder infection: pain when you urinate (use the toilet), need to go more often and more urgently.  The bag of farley (rupture of membranes) breaks, or you notice leaking in your underwear.  Bright red blood in your underwear.  Abdominal (lower belly) or stomach pain.  Second (plus) baby: Contractions (tightening) less than 10 minutes apart and getting stronger.  Increase or change in vaginal discharge (note the color and amount)    Follow-up:  As scheduled in the clinic

## 2022-09-01 NOTE — PROVIDER NOTIFICATION
09/01/22 1637   Provider Notification   Provider Name/Title Dr. Janie Velazquez   Method of Notification Phone   Request Evaluate - Remote   Notification Reason Patient Arrived;Other (Comment)  (Provider notified of pts arrival & complaint, as well as reactive NST. T.O.R.B. received to discharge pt to home.)

## 2022-09-01 NOTE — PLAN OF CARE
36+1 pt admitted to [facility], ambulatory per services of Dr. Croft for evaluation of fall.  Pt states she was walking up the stairs & slipped, landing onto her hands & knees only, did not hit or fall onto abdomen at 1300 today. Pt denies any cramping/contractions or leaking or bleeding vaginally. Discussed plan of care including EFM with NST & routine VS. Pt agreeable.  EFM applied and admission assessment completed.      1642: Pt discharged to home per provider order after reactive NST. Pt is in agreement with this plan & denies any current concerns.  Discharge instructions explained to pt & pt verbalizes understanding of explained instructions.

## 2022-09-18 ENCOUNTER — HEALTH MAINTENANCE LETTER (OUTPATIENT)
Age: 27
End: 2022-09-18

## 2022-09-23 DIAGNOSIS — Z01.818 PRE-OPERATIVE EXAMINATION: Primary | ICD-10-CM

## 2022-09-26 ENCOUNTER — ALLIED HEALTH/NURSE VISIT (OUTPATIENT)
Dept: NURSING | Facility: CLINIC | Age: 27
End: 2022-09-26
Payer: COMMERCIAL

## 2022-09-26 DIAGNOSIS — Z01.818 PRE-OPERATIVE EXAMINATION: ICD-10-CM

## 2022-09-26 LAB — SARS-COV-2 RNA RESP QL NAA+PROBE: NEGATIVE

## 2022-09-26 PROCEDURE — U0003 INFECTIOUS AGENT DETECTION BY NUCLEIC ACID (DNA OR RNA); SEVERE ACUTE RESPIRATORY SYNDROME CORONAVIRUS 2 (SARS-COV-2) (CORONAVIRUS DISEASE [COVID-19]), AMPLIFIED PROBE TECHNIQUE, MAKING USE OF HIGH THROUGHPUT TECHNOLOGIES AS DESCRIBED BY CMS-2020-01-R: HCPCS

## 2022-09-26 PROCEDURE — U0005 INFEC AGEN DETEC AMPLI PROBE: HCPCS

## 2022-09-27 ENCOUNTER — HOSPITAL ENCOUNTER (INPATIENT)
Facility: CLINIC | Age: 27
LOS: 2 days | Discharge: HOME | End: 2022-09-29
Attending: OBSTETRICS & GYNECOLOGY | Admitting: OBSTETRICS & GYNECOLOGY
Payer: COMMERCIAL

## 2022-09-27 ENCOUNTER — ANESTHESIA (OUTPATIENT)
Dept: OBGYN | Facility: CLINIC | Age: 27
End: 2022-09-27
Payer: COMMERCIAL

## 2022-09-27 ENCOUNTER — ANESTHESIA EVENT (OUTPATIENT)
Dept: OBGYN | Facility: CLINIC | Age: 27
End: 2022-09-27
Payer: COMMERCIAL

## 2022-09-27 DIAGNOSIS — Z3A.39 39 WEEKS GESTATION OF PREGNANCY: Primary | ICD-10-CM

## 2022-09-27 DIAGNOSIS — Z36.89 ENCOUNTER FOR TRIAGE IN PREGNANT PATIENT: ICD-10-CM

## 2022-09-27 LAB
ABO/RH(D): NORMAL
ANTIBODY SCREEN: NEGATIVE
ERYTHROCYTE [DISTWIDTH] IN BLOOD BY AUTOMATED COUNT: 12.3 % (ref 10–15)
HCT VFR BLD AUTO: 38.6 % (ref 35–47)
HGB BLD-MCNC: 13.3 G/DL (ref 11.7–15.7)
MCH RBC QN AUTO: 31.8 PG (ref 26.5–33)
MCHC RBC AUTO-ENTMCNC: 34.5 G/DL (ref 31.5–36.5)
MCV RBC AUTO: 92 FL (ref 78–100)
PLATELET # BLD AUTO: 202 10E3/UL (ref 150–450)
RBC # BLD AUTO: 4.18 10E6/UL (ref 3.8–5.2)
SPECIMEN EXPIRATION DATE: NORMAL
T PALLIDUM AB SER QL: NONREACTIVE
WBC # BLD AUTO: 11.9 10E3/UL (ref 4–11)

## 2022-09-27 PROCEDURE — 722N000001 HC LABOR CARE VAGINAL DELIVERY SINGLE

## 2022-09-27 PROCEDURE — 10907ZC DRAINAGE OF AMNIOTIC FLUID, THERAPEUTIC FROM PRODUCTS OF CONCEPTION, VIA NATURAL OR ARTIFICIAL OPENING: ICD-10-PCS | Performed by: OBSTETRICS & GYNECOLOGY

## 2022-09-27 PROCEDURE — 3E0R3BZ INTRODUCTION OF ANESTHETIC AGENT INTO SPINAL CANAL, PERCUTANEOUS APPROACH: ICD-10-PCS | Performed by: OBSTETRICS & GYNECOLOGY

## 2022-09-27 PROCEDURE — 00HU33Z INSERTION OF INFUSION DEVICE INTO SPINAL CANAL, PERCUTANEOUS APPROACH: ICD-10-PCS | Performed by: OBSTETRICS & GYNECOLOGY

## 2022-09-27 PROCEDURE — 250N000011 HC RX IP 250 OP 636: Performed by: ANESTHESIOLOGY

## 2022-09-27 PROCEDURE — 3E033VJ INTRODUCTION OF OTHER HORMONE INTO PERIPHERAL VEIN, PERCUTANEOUS APPROACH: ICD-10-PCS | Performed by: OBSTETRICS & GYNECOLOGY

## 2022-09-27 PROCEDURE — 85027 COMPLETE CBC AUTOMATED: CPT | Performed by: OBSTETRICS & GYNECOLOGY

## 2022-09-27 PROCEDURE — 86901 BLOOD TYPING SEROLOGIC RH(D): CPT | Performed by: OBSTETRICS & GYNECOLOGY

## 2022-09-27 PROCEDURE — 250N000009 HC RX 250: Performed by: OBSTETRICS & GYNECOLOGY

## 2022-09-27 PROCEDURE — 250N000011 HC RX IP 250 OP 636: Performed by: OBSTETRICS & GYNECOLOGY

## 2022-09-27 PROCEDURE — 120N000012 HC R&B POSTPARTUM

## 2022-09-27 PROCEDURE — 258N000003 HC RX IP 258 OP 636: Performed by: OBSTETRICS & GYNECOLOGY

## 2022-09-27 PROCEDURE — 250N000013 HC RX MED GY IP 250 OP 250 PS 637: Performed by: OBSTETRICS & GYNECOLOGY

## 2022-09-27 PROCEDURE — 86780 TREPONEMA PALLIDUM: CPT | Performed by: OBSTETRICS & GYNECOLOGY

## 2022-09-27 PROCEDURE — 370N000003 HC ANESTHESIA WARD SERVICE

## 2022-09-27 RX ORDER — LIDOCAINE 40 MG/G
CREAM TOPICAL
Status: DISCONTINUED | OUTPATIENT
Start: 2022-09-27 | End: 2022-09-27 | Stop reason: HOSPADM

## 2022-09-27 RX ORDER — OXYTOCIN/0.9 % SODIUM CHLORIDE 30/500 ML
340 PLASTIC BAG, INJECTION (ML) INTRAVENOUS CONTINUOUS PRN
Status: DISCONTINUED | OUTPATIENT
Start: 2022-09-27 | End: 2022-09-27 | Stop reason: HOSPADM

## 2022-09-27 RX ORDER — EPHEDRINE SULFATE 50 MG/ML
5 INJECTION, SOLUTION INTRAMUSCULAR; INTRAVENOUS; SUBCUTANEOUS
Status: DISCONTINUED | OUTPATIENT
Start: 2022-09-27 | End: 2022-09-27 | Stop reason: HOSPADM

## 2022-09-27 RX ORDER — ONDANSETRON 2 MG/ML
4 INJECTION INTRAMUSCULAR; INTRAVENOUS EVERY 6 HOURS PRN
Status: DISCONTINUED | OUTPATIENT
Start: 2022-09-27 | End: 2022-09-27 | Stop reason: HOSPADM

## 2022-09-27 RX ORDER — KETOROLAC TROMETHAMINE 30 MG/ML
30 INJECTION, SOLUTION INTRAMUSCULAR; INTRAVENOUS
Status: DISCONTINUED | OUTPATIENT
Start: 2022-09-27 | End: 2022-09-28

## 2022-09-27 RX ORDER — ACETAMINOPHEN 325 MG/1
650 TABLET ORAL EVERY 4 HOURS PRN
Status: DISCONTINUED | OUTPATIENT
Start: 2022-09-27 | End: 2022-09-29 | Stop reason: HOSPADM

## 2022-09-27 RX ORDER — NALOXONE HYDROCHLORIDE 0.4 MG/ML
0.4 INJECTION, SOLUTION INTRAMUSCULAR; INTRAVENOUS; SUBCUTANEOUS
Status: DISCONTINUED | OUTPATIENT
Start: 2022-09-27 | End: 2022-09-27 | Stop reason: HOSPADM

## 2022-09-27 RX ORDER — DOCUSATE SODIUM 100 MG/1
100 CAPSULE, LIQUID FILLED ORAL DAILY
Status: DISCONTINUED | OUTPATIENT
Start: 2022-09-27 | End: 2022-09-29 | Stop reason: HOSPADM

## 2022-09-27 RX ORDER — TRANEXAMIC ACID 10 MG/ML
1 INJECTION, SOLUTION INTRAVENOUS EVERY 30 MIN PRN
Status: DISCONTINUED | OUTPATIENT
Start: 2022-09-27 | End: 2022-09-27 | Stop reason: HOSPADM

## 2022-09-27 RX ORDER — OXYTOCIN 10 [USP'U]/ML
10 INJECTION, SOLUTION INTRAMUSCULAR; INTRAVENOUS
Status: DISCONTINUED | OUTPATIENT
Start: 2022-09-27 | End: 2022-09-27 | Stop reason: HOSPADM

## 2022-09-27 RX ORDER — PROCHLORPERAZINE MALEATE 5 MG
10 TABLET ORAL EVERY 6 HOURS PRN
Status: DISCONTINUED | OUTPATIENT
Start: 2022-09-27 | End: 2022-09-27 | Stop reason: HOSPADM

## 2022-09-27 RX ORDER — PROCHLORPERAZINE 25 MG
25 SUPPOSITORY, RECTAL RECTAL EVERY 12 HOURS PRN
Status: DISCONTINUED | OUTPATIENT
Start: 2022-09-27 | End: 2022-09-27 | Stop reason: HOSPADM

## 2022-09-27 RX ORDER — IBUPROFEN 400 MG/1
800 TABLET, FILM COATED ORAL
Status: DISCONTINUED | OUTPATIENT
Start: 2022-09-27 | End: 2022-09-27

## 2022-09-27 RX ORDER — CARBOPROST TROMETHAMINE 250 UG/ML
250 INJECTION, SOLUTION INTRAMUSCULAR
Status: DISCONTINUED | OUTPATIENT
Start: 2022-09-27 | End: 2022-09-29 | Stop reason: HOSPADM

## 2022-09-27 RX ORDER — NALOXONE HYDROCHLORIDE 0.4 MG/ML
0.2 INJECTION, SOLUTION INTRAMUSCULAR; INTRAVENOUS; SUBCUTANEOUS
Status: DISCONTINUED | OUTPATIENT
Start: 2022-09-27 | End: 2022-09-27 | Stop reason: HOSPADM

## 2022-09-27 RX ORDER — CARBOPROST TROMETHAMINE 250 UG/ML
250 INJECTION, SOLUTION INTRAMUSCULAR
Status: DISCONTINUED | OUTPATIENT
Start: 2022-09-27 | End: 2022-09-27 | Stop reason: HOSPADM

## 2022-09-27 RX ORDER — NALBUPHINE HYDROCHLORIDE 10 MG/ML
2.5-5 INJECTION, SOLUTION INTRAMUSCULAR; INTRAVENOUS; SUBCUTANEOUS EVERY 6 HOURS PRN
Status: DISCONTINUED | OUTPATIENT
Start: 2022-09-27 | End: 2022-09-29 | Stop reason: HOSPADM

## 2022-09-27 RX ORDER — OXYTOCIN/0.9 % SODIUM CHLORIDE 30/500 ML
1-24 PLASTIC BAG, INJECTION (ML) INTRAVENOUS CONTINUOUS
Status: DISCONTINUED | OUTPATIENT
Start: 2022-09-27 | End: 2022-09-27 | Stop reason: HOSPADM

## 2022-09-27 RX ORDER — MISOPROSTOL 200 UG/1
400 TABLET ORAL
Status: DISCONTINUED | OUTPATIENT
Start: 2022-09-27 | End: 2022-09-27 | Stop reason: HOSPADM

## 2022-09-27 RX ORDER — OXYTOCIN/0.9 % SODIUM CHLORIDE 30/500 ML
340 PLASTIC BAG, INJECTION (ML) INTRAVENOUS CONTINUOUS PRN
Status: DISCONTINUED | OUTPATIENT
Start: 2022-09-27 | End: 2022-09-29 | Stop reason: HOSPADM

## 2022-09-27 RX ORDER — MISOPROSTOL 200 UG/1
400 TABLET ORAL
Status: DISCONTINUED | OUTPATIENT
Start: 2022-09-27 | End: 2022-09-29 | Stop reason: HOSPADM

## 2022-09-27 RX ORDER — ONDANSETRON 4 MG/1
4 TABLET, ORALLY DISINTEGRATING ORAL EVERY 6 HOURS PRN
Status: DISCONTINUED | OUTPATIENT
Start: 2022-09-27 | End: 2022-09-27 | Stop reason: HOSPADM

## 2022-09-27 RX ORDER — HYDROCORTISONE 25 MG/G
CREAM TOPICAL 3 TIMES DAILY PRN
Status: DISCONTINUED | OUTPATIENT
Start: 2022-09-27 | End: 2022-09-29 | Stop reason: HOSPADM

## 2022-09-27 RX ORDER — TRANEXAMIC ACID 10 MG/ML
1 INJECTION, SOLUTION INTRAVENOUS EVERY 30 MIN PRN
Status: DISCONTINUED | OUTPATIENT
Start: 2022-09-27 | End: 2022-09-29 | Stop reason: HOSPADM

## 2022-09-27 RX ORDER — IBUPROFEN 400 MG/1
800 TABLET, FILM COATED ORAL EVERY 6 HOURS PRN
Status: DISCONTINUED | OUTPATIENT
Start: 2022-09-27 | End: 2022-09-29

## 2022-09-27 RX ORDER — METOCLOPRAMIDE 10 MG/1
10 TABLET ORAL EVERY 6 HOURS PRN
Status: DISCONTINUED | OUTPATIENT
Start: 2022-09-27 | End: 2022-09-27 | Stop reason: HOSPADM

## 2022-09-27 RX ORDER — METOCLOPRAMIDE HYDROCHLORIDE 5 MG/ML
10 INJECTION INTRAMUSCULAR; INTRAVENOUS EVERY 6 HOURS PRN
Status: DISCONTINUED | OUTPATIENT
Start: 2022-09-27 | End: 2022-09-27 | Stop reason: HOSPADM

## 2022-09-27 RX ORDER — SODIUM CHLORIDE, SODIUM LACTATE, POTASSIUM CHLORIDE, CALCIUM CHLORIDE 600; 310; 30; 20 MG/100ML; MG/100ML; MG/100ML; MG/100ML
INJECTION, SOLUTION INTRAVENOUS CONTINUOUS PRN
Status: DISCONTINUED | OUTPATIENT
Start: 2022-09-27 | End: 2022-09-27 | Stop reason: HOSPADM

## 2022-09-27 RX ORDER — ROPIVACAINE HYDROCHLORIDE 2 MG/ML
10 INJECTION, SOLUTION EPIDURAL; INFILTRATION; PERINEURAL ONCE
Status: COMPLETED | OUTPATIENT
Start: 2022-09-27 | End: 2022-09-27

## 2022-09-27 RX ORDER — MISOPROSTOL 200 UG/1
800 TABLET ORAL
Status: DISCONTINUED | OUTPATIENT
Start: 2022-09-27 | End: 2022-09-29 | Stop reason: HOSPADM

## 2022-09-27 RX ORDER — METHYLERGONOVINE MALEATE 0.2 MG/ML
200 INJECTION INTRAVENOUS
Status: DISCONTINUED | OUTPATIENT
Start: 2022-09-27 | End: 2022-09-27 | Stop reason: HOSPADM

## 2022-09-27 RX ORDER — CITRIC ACID/SODIUM CITRATE 334-500MG
30 SOLUTION, ORAL ORAL
Status: DISCONTINUED | OUTPATIENT
Start: 2022-09-27 | End: 2022-09-27 | Stop reason: HOSPADM

## 2022-09-27 RX ORDER — OXYTOCIN 10 [USP'U]/ML
10 INJECTION, SOLUTION INTRAMUSCULAR; INTRAVENOUS
Status: DISCONTINUED | OUTPATIENT
Start: 2022-09-27 | End: 2022-09-29 | Stop reason: HOSPADM

## 2022-09-27 RX ORDER — MISOPROSTOL 200 UG/1
800 TABLET ORAL
Status: DISCONTINUED | OUTPATIENT
Start: 2022-09-27 | End: 2022-09-27 | Stop reason: HOSPADM

## 2022-09-27 RX ORDER — METHYLERGONOVINE MALEATE 0.2 MG/ML
200 INJECTION INTRAVENOUS
Status: DISCONTINUED | OUTPATIENT
Start: 2022-09-27 | End: 2022-09-29 | Stop reason: HOSPADM

## 2022-09-27 RX ORDER — MODIFIED LANOLIN
OINTMENT (GRAM) TOPICAL
Status: DISCONTINUED | OUTPATIENT
Start: 2022-09-27 | End: 2022-09-29 | Stop reason: HOSPADM

## 2022-09-27 RX ORDER — OXYTOCIN 10 [USP'U]/ML
10 INJECTION, SOLUTION INTRAMUSCULAR; INTRAVENOUS
Status: DISCONTINUED | OUTPATIENT
Start: 2022-09-27 | End: 2022-09-28

## 2022-09-27 RX ORDER — OXYTOCIN/0.9 % SODIUM CHLORIDE 30/500 ML
100-340 PLASTIC BAG, INJECTION (ML) INTRAVENOUS CONTINUOUS PRN
Status: DISCONTINUED | OUTPATIENT
Start: 2022-09-27 | End: 2022-09-28

## 2022-09-27 RX ORDER — BISACODYL 10 MG
10 SUPPOSITORY, RECTAL RECTAL DAILY PRN
Status: DISCONTINUED | OUTPATIENT
Start: 2022-09-27 | End: 2022-09-29 | Stop reason: HOSPADM

## 2022-09-27 RX ADMIN — SODIUM CHLORIDE, POTASSIUM CHLORIDE, SODIUM LACTATE AND CALCIUM CHLORIDE 1000 ML: 600; 310; 30; 20 INJECTION, SOLUTION INTRAVENOUS at 15:05

## 2022-09-27 RX ADMIN — Medication 2 MILLI-UNITS/MIN: at 12:37

## 2022-09-27 RX ADMIN — SODIUM CHLORIDE, POTASSIUM CHLORIDE, SODIUM LACTATE AND CALCIUM CHLORIDE: 600; 310; 30; 20 INJECTION, SOLUTION INTRAVENOUS at 12:37

## 2022-09-27 RX ADMIN — Medication: at 15:55

## 2022-09-27 RX ADMIN — IBUPROFEN 800 MG: 400 TABLET, FILM COATED ORAL at 18:41

## 2022-09-27 RX ADMIN — Medication 340 ML/HR: at 16:50

## 2022-09-27 RX ADMIN — ACETAMINOPHEN 650 MG: 325 TABLET, FILM COATED ORAL at 18:40

## 2022-09-27 RX ADMIN — TRANEXAMIC ACID 1 G: 10 INJECTION, SOLUTION INTRAVENOUS at 18:11

## 2022-09-27 RX ADMIN — ROPIVACAINE HYDROCHLORIDE 10 ML: 2 INJECTION, SOLUTION EPIDURAL; INFILTRATION at 15:48

## 2022-09-27 RX ADMIN — METHYLERGONOVINE MALEATE 200 MCG: 0.2 INJECTION INTRAVENOUS at 18:11

## 2022-09-27 ASSESSMENT — ACTIVITIES OF DAILY LIVING (ADL)
ADLS_ACUITY_SCORE: 18
WEAR_GLASSES_OR_BLIND: NO
CONCENTRATING,_REMEMBERING_OR_MAKING_DECISIONS_DIFFICULTY: NO
ADLS_ACUITY_SCORE: 18
DRESSING/BATHING_DIFFICULTY: NO
ADLS_ACUITY_SCORE: 35
DIFFICULTY_EATING/SWALLOWING: NO
ADLS_ACUITY_SCORE: 18
NUMBER_OF_TIMES_PATIENT_HAS_FALLEN_WITHIN_LAST_SIX_MONTHS: 1
DOING_ERRANDS_INDEPENDENTLY_DIFFICULTY: NO
CHANGE_IN_FUNCTIONAL_STATUS_SINCE_ONSET_OF_CURRENT_ILLNESS/INJURY: NO
TOILETING_ISSUES: NO
WALKING_OR_CLIMBING_STAIRS_DIFFICULTY: NO
FALL_HISTORY_WITHIN_LAST_SIX_MONTHS: YES

## 2022-09-27 NOTE — H&P
[facility] Labor and Delivery History and Physical    Janneth Mcclain MRN# [id number]   Age: 27 year old YOB: 1995     Date of Admission:  2022    Primary care provider: Reyes, [place]           HPI:   Janneth Mcclain is a 27 year old  at 39w6d admitted for EIL. Pregnancy c/b: obesity in pregnancy, hx/o recurrent pregnancy loss, anxiety/depression on no medications.    Doing well.           Pregnancy history:     OBSTETRIC HISTORY:  OB History    Para Term  AB Living   4 1 1 0 2 1   SAB IAB Ectopic Multiple Live Births   2 0 0 0 1      # Outcome Date GA Lbr Bob/2nd Weight Sex Delivery Anes PTL Lv   4 Current            3 Term 21 39w1d 03:20 / 02:09 3.86 kg (8 lb 8.2 oz) M Vag-Spont EPI N RAUL      Name: BABS,MALE-JANNETH      Apgar1: 9  Apgar5: 9   2 SAB 2020           1 SAB 2019               EDC: Estimated Date of Delivery: Sep 28, 2022    Prenatal Labs:   Lab Results   Component Value Date    ABO O 2021    RH Pos 2021    AS neg 2020    HEPBANG neg 2020    RUBELLAABIGG not immune 2020    HGB 13.2 2021       GBS Status:   Lab Results   Component Value Date    GBS neg 2021             Maternal Past Medical History:     Past Medical History:   Diagnosis Date     Depressive disorder     Therapist for anxiety currently     Past Surgical History:   Procedure Laterality Date     AS REMOVAL ADENOIDS,PRIMARY,<13 Y/O       DILATION AND CURETTAGE  2019; 3/2020      Medications Prior to Admission   Medication Sig Dispense Refill Last Dose     fish oil-omega-3 fatty acids 1000 MG capsule Take 2 g by mouth daily        hydrocortisone, Perianal, (HYDROCORTISONE) 2.5 % cream Place rectally 2 times daily as needed for hemorrhoids 30 g 0      Prenatal-FeFum-FA-DHA w/o A (PRENATAL + DHA PO) Take 1 tablet by mouth daily        vitamin B-Complex Take 1 tablet by mouth daily        vitamin D2 (ERGOCALCIFEROL) 06407  units (1250 mcg) capsule Take 50,000 Units by mouth once a week              Family History:   The family history includes Pancreatic Cancer in her maternal grandfather.          Social History:     Social History     Tobacco Use     Smoking status: Never Smoker     Smokeless tobacco: Never Used   Substance Use Topics     Alcohol use: Not Currently            Review of Systems:   The Review of Systems is negative other than noted in the HPI          Physical Exam:     Patient Vitals for the past 8 hrs:   BP Temp Temp src Pulse Resp   22 1120 121/75 98.1  F (36.7  C) Temporal 78 16     Gen: NAD  CV: normal  Resp: unlabored  Abd: gravid, NT  Ext: no edema, NT    Cervix: 3/60%/-3, soft, post  Membranes: intact  EFW: 9 lbs  Presentation:Cephalic    NST  Fetal Heart Rate Tracing:   Baseline 140  Variability: mod  Accelerations: Present  Decelerations: None  Interpretation: reactive    Contractions: occ        Assessment:   Janneth Mcclain is a 27 year old  at 39w6d admitted for EIL.         Plan:   1. Admit to LDR  2. Fetal wellbeing: Category 1  3. AROM with clear fluid  4. Type and screen and CBC  5. Continuous EFM and [place]  6. GBS negative  7. Pitocin per protocol  8. Anticipate vaginal delivery    Zoraida Morales MD  2022  11:27 AM

## 2022-09-27 NOTE — PROVIDER NOTIFICATION
09/27/22 1620   Provider Notification   Provider Name/Title Dr. ELÍAS Morales   Method of Notification Electronic Page   Request Attend Delivery

## 2022-09-27 NOTE — ANESTHESIA PROCEDURE NOTES
Epidural catheter Procedure Note    Pre-Procedure   Staff -        Anesthesiologist:  Surjit Rubio MD       Performed By: anesthesiologist       Location: OB       Pre-Anesthestic Checklist: patient identified, IV checked, risks and benefits discussed, informed consent, monitors and equipment checked, pre-op evaluation and at physician/surgeon's request  Timeout:       Correct Patient: Yes        Correct Procedure: Yes        Correct Site: Yes        Correct Position: Yes   Procedure Documentation  Procedure: epidural catheter       Patient Position: sitting       Patient Prep/Sterile Barriers: sterile gloves, mask, patient draped       Skin prep: Betadine and DuraPrep       Local skin infiltrated with mL of 1% lidocaine.        Insertion Site: L2-3. (midline approach).       Technique: LORT saline        CHASITY at 5 cm.       Needle Type: Marfeely needle       Needle Gauge: 17.        Needle Length (Inches): 5        Catheter: 19 G.          Catheter threaded easily.         5 cm epidural space.         Threaded 10 cm at skin.         # of attempts: 1 and  # of redirects:          : 0.    Assessment/Narrative         Paresthesias: No.       Test dose of 3 mL lidocaine 1.5% w/ 1:200,000 epinephrine at.         Test dose negative, 3 minutes after injection, for signs of intravascular, subdural, or intrathecal injection.       Insertion/Infusion Method: LORT saline       No aspiration negative for Heme or CSF via Epidural Catheter.    Medication(s) Administered   ROPivacaine (NAROPIN) injection 10 mL - EPIDURAL   10 mL - 9/27/2022 3:48:00 PM   Comments:  Pt tolerated well.    Taped sterile and secure.   FHTs stable post-procedure.   No complications.

## 2022-09-27 NOTE — PLAN OF CARE
Data: Patient admitted to room 212 at 1120. Patient is a . Prenatal record reviewed.   OB History    Para Term  AB Living   4 1 1 0 2 1   SAB IAB Ectopic Multiple Live Births   2 0 0 0 1      # Outcome Date GA Lbr Bob/2nd Weight Sex Delivery Anes PTL Lv   4 Current            3 Term 21 39w1d 03:20 / 02:09 3.86 kg (8 lb 8.2 oz) M Vag-Spont EPI N RAUL      Name: AUGUST BRICE      Apgar1: 9  Apgar5: 9   2 SAB 2020           1 2019           .  Medical History:   Past Medical History:   Diagnosis Date     Depressive disorder     Therapist for anxiety currently   .  Gestational age 39w6d. Vital signs per doc flowsheet. Fetal movement present. Patient reports Induction Of Labor   as reason for admission. Support persons Zac present.  Action: Care of patient assumed at 1120. Verbal consent for EFM, external fetal monitors applied. Admission assessment completed. Patient and support persons educated on labor process. Patient instructed to report change in fetal movement, contractions, vaginal leaking of fluid or bleeding, abdominal pain, or any concerns related to the pregnancy to her nurse/physician. Patient oriented to room, call light in reach.   Response: Dr. ELÍAS Morales informed of pts arrival. Plan per provider is AROM & IV Oxytocin infusion for labor induction. Patient verbalized understanding of education and verbalized agreement with plan. Patient coping with labor via plans to get epidural when she becomes uncomfortable.

## 2022-09-27 NOTE — PROVIDER NOTIFICATION
09/27/22 1120   Provider Notification   Provider Name/Title Dr. GORDON Morales   Method of Notification At Bedside   Notification Reason Patient Arrived;Membrane Status;SVE;Other (Comment)  (Provider to bedside to discuss plans for cares for induction today, SVE, & AROM.)

## 2022-09-27 NOTE — ANESTHESIA PREPROCEDURE EVALUATION
Anesthesia Pre-Procedure Evaluation    Patient: Janneth Mcclain   MRN: [id number] : 1995        Procedure :           Past Medical History:   Diagnosis Date     Depressive disorder     Therapist for anxiety currently      Past Surgical History:   Procedure Laterality Date     AS REMOVAL ADENOIDS,PRIMARY,<13 Y/O       DILATION AND CURETTAGE  2019; 3/2020      Allergies   Allergen Reactions     Amoxicillin Rash      Social History     Tobacco Use     Smoking status: Never Smoker     Smokeless tobacco: Never Used   Substance Use Topics     Alcohol use: Not Currently      Wt Readings from Last 1 Encounters:   22 97.5 kg (215 lb)        Anesthesia Evaluation            ROS/MED HX  ENT/Pulmonary:    (-) asthma   Neurologic:  - neg neurologic ROS     Cardiovascular:     (+) --PIH ---    METS/Exercise Tolerance:     Hematologic:     (+) no anticoagulation therapy, no coagulopathy,     Musculoskeletal:       GI/Hepatic:     (+) GERD,     Renal/Genitourinary:       Endo:     (+) Obesity,     Psychiatric/Substance Use:       Infectious Disease:       Malignancy:       Other:            Physical Exam    Airway        Mallampati: II   TM distance: > 3 FB   Neck ROM: full     Respiratory Devices and Support         Dental  no notable dental history         Cardiovascular   cardiovascular exam normal          Pulmonary   pulmonary exam normal                OUTSIDE LABS:  CBC:   Lab Results   Component Value Date    WBC 11.9 (H) 2022    WBC 7.4 2021    HGB 13.3 2022    HGB 13.2 2021    HCT 38.6 2022    HCT 36.6 2021     2022     2021     BMP:   Lab Results   Component Value Date     2021     2021    POTASSIUM 4.2 2021    POTASSIUM 4.0 2021    CHLORIDE 107 2021    CHLORIDE 108 2021    CO2 31 2021    CO2 26 2021    BUN 14 2021    BUN 19 2021    CR 0.78 2021    CR 0.69 2021     GLC 78 06/17/2021    GLC 82 02/28/2021     COAGS: No results found for: PTT, INR, FIBR  POC:   Lab Results   Component Value Date    HCG Negative 10/17/2019     HEPATIC:   Lab Results   Component Value Date    ALBUMIN 4.0 06/17/2021    PROTTOTAL 8.1 06/17/2021    ALT 20 06/17/2021    AST 13 06/17/2021    ALKPHOS 94 06/17/2021    BILITOTAL 0.8 06/17/2021     OTHER:   Lab Results   Component Value Date    LESLY 9.2 06/17/2021    LIPASE 42 (L) 06/17/2021       Anesthesia Plan    ASA Status:  2      Anesthesia Type: Epidural.              Consents    Anesthesia Plan(s) and associated risks, benefits, and realistic alternatives discussed. Questions answered and patient/representative(s) expressed understanding.    - Discussed:     - Discussed with:  Patient         Postoperative Care            Comments:    Other Comments: Orders to manage the epidural infusion have been entered, and through coordination with the nurse, we will continute to manage and monitor the patient's labor epidural.  We will continuously be available to adjust as needed thruout the entire L&D process.             Surjit Rubio MD

## 2022-09-27 NOTE — PROVIDER NOTIFICATION
09/27/22 1635   Provider Notification   Provider Name/Title Dr. ELÍAS Morales   Method of Notification At Bedside   Request Attend Delivery

## 2022-09-27 NOTE — L&D DELIVERY NOTE
DELIVERY SUMMARY:  Date: 2022     HISTORY:   Janneth Mcclain is a 27 year old  at 39w6d admitted for EIL. Pregnancy c/b: obesity in pregnancy, hx/o recurrent pregnancy loss, anxiety/depression on no medications.     GBS negative.  She is Rh negative and Rubella non-immune.      FIRST STAGE:  She presented to labor and delivery for EIL.   Amniotic fluid was noted to be clear at the time of amniotomy at 1120- pitocin was started at around 1300.  She progressed to complete at 1635.  Epidural analgesia.    SECOND STAGE:  Fetal heart tones were reassuring during the second stage of labor.  Head delivered NIC over a intact perineum. Single nuchal cord reduced. Shoulders were delivered without difficulty and the remainder of the body followed- no dystocia noted but shoulders and body did delivery slower than usual- less than 30 seconds from head to full body delivery.  The infant was placed directly on the maternal abdomen.  Cord clamping was delayed 30 seconds after which the cord was clamped and cut. She was brought to the warmer for stimulation and did well.  Cord blood was obtained.  IV Pitocin was given through running IV.  Apgars pending- but likely 9 at 5 minutes.  Weight 9 lbs 1 oz.    THIRD STAGE:  The placenta delivered spontaneously and was inspected and intact. There were no lacerations requiring repair.  The uterus was noted to be firm.  Sponge and needle counts were correct.  QBL was 200 cc.  Mom and baby doing well and stable to transfer to postpartum recovery.    Zoraida Morales MD  Obstetrics, Gynecology, and Infertility

## 2022-09-27 NOTE — PROVIDER NOTIFICATION
09/27/22 1522   Provider Notification   Provider Name/Title Dr. ELÍAS Morales   Method of Notification Electronic Page   Request Evaluate - Remote   Notification Reason Labor Status;Uterine Activity;Pain;Status Update;SVE  (Provider given pt status labor update, notified of SVE, & that pt is getting prepped for epidural.)

## 2022-09-27 NOTE — PROVIDER NOTIFICATION
09/27/22 1805   Provider Notification   Provider Name/Title Dr. ELÍAS Morales   Method of Notification Phone   Request Evaluate-Remote   Notification Reason Other  (Provider notifed of continued moderate amount of vaginal bleeding with fundal checks. FF, no clots, flow slows after each fundal massage. T.O.R.B. received for IM Methergine & IV TXA.)

## 2022-09-27 NOTE — PLAN OF CARE
Viable baby girl delivered vaginally at 1646 after 9 minutes of pushing.  No perineal or vaginal lacerations.  VSS  Fundas has remained firm during immediate post partum recovery period, however pt did have moderate amount of vaginal bleeding with each fundal massage until 1815, improving after increased rate of IV Oxytocin infusion, IM Methergine, & IV TXA.  Delivery QBL was 200 mL & post partum QBL thus far has been 296 mL, for a total of 496 mL blood loss.   Pt was straight cathed for 300 mL urine at 1615, prior to delivery.  Pt denies any immediate post partum pain, was given PO Tylenol & Ibuprofen for pain prophylaxis.   Epidural was turned off at 1655, & pt has yet to ambulate at conclusion of this shift.

## 2022-09-28 LAB — HGB BLD-MCNC: 12.1 G/DL (ref 11.7–15.7)

## 2022-09-28 PROCEDURE — 250N000013 HC RX MED GY IP 250 OP 250 PS 637: Performed by: OBSTETRICS & GYNECOLOGY

## 2022-09-28 PROCEDURE — 120N000012 HC R&B POSTPARTUM

## 2022-09-28 PROCEDURE — 36415 COLL VENOUS BLD VENIPUNCTURE: CPT | Performed by: OBSTETRICS & GYNECOLOGY

## 2022-09-28 PROCEDURE — 85018 HEMOGLOBIN: CPT | Performed by: OBSTETRICS & GYNECOLOGY

## 2022-09-28 RX ORDER — NALOXONE HYDROCHLORIDE 0.4 MG/ML
0.4 INJECTION, SOLUTION INTRAMUSCULAR; INTRAVENOUS; SUBCUTANEOUS
Status: DISCONTINUED | OUTPATIENT
Start: 2022-09-28 | End: 2022-09-29 | Stop reason: HOSPADM

## 2022-09-28 RX ORDER — NALOXONE HYDROCHLORIDE 0.4 MG/ML
0.2 INJECTION, SOLUTION INTRAMUSCULAR; INTRAVENOUS; SUBCUTANEOUS
Status: DISCONTINUED | OUTPATIENT
Start: 2022-09-28 | End: 2022-09-29 | Stop reason: HOSPADM

## 2022-09-28 RX ADMIN — ACETAMINOPHEN 650 MG: 325 TABLET, FILM COATED ORAL at 01:39

## 2022-09-28 RX ADMIN — DOCUSATE SODIUM 100 MG: 100 CAPSULE, LIQUID FILLED ORAL at 08:16

## 2022-09-28 RX ADMIN — ACETAMINOPHEN 650 MG: 325 TABLET, FILM COATED ORAL at 18:59

## 2022-09-28 RX ADMIN — IBUPROFEN 800 MG: 400 TABLET, FILM COATED ORAL at 01:38

## 2022-09-28 RX ADMIN — ACETAMINOPHEN 650 MG: 325 TABLET, FILM COATED ORAL at 08:16

## 2022-09-28 RX ADMIN — ACETAMINOPHEN 650 MG: 325 TABLET, FILM COATED ORAL at 14:20

## 2022-09-28 ASSESSMENT — ACTIVITIES OF DAILY LIVING (ADL)
ADLS_ACUITY_SCORE: 18

## 2022-09-28 NOTE — PLAN OF CARE
VSS. Patient ambulating free of dizziness. Voiding without difficulty. Pain managed on tylenol and ibuprofen. Second fundal check after arrival from labor produced a moderate flow that slowed to a trickle, fundus firm and midline. Due to patient's anatomy, fundus has consistently been up 1. Fibroid noted left of the uterus upon palpation. Working on breastfeeding every 2-3 hours. Attentive to and bonding well with infant. Will continue to monitor.

## 2022-09-28 NOTE — PLAN OF CARE
Pt. admitted from L&D  via wheelchair and transferred to bed with Standby assist. Pt. arrived with baby and was accompanied by  and arrived with personal belongings. Report was taken from Sharon GUTIERREZ in L&D. VSS. Fundus is firm and midline.  Vaginal bleeding is scant.  IV infusing 2nd bag of pit in LAC.  Pt. oriented to the room and call light system.

## 2022-09-28 NOTE — PROGRESS NOTES
"[facility] Obstetrics Postpartum Progress Note  2022     S: Pt doing well. Pain is well controlled. Bleeding Light. Infant is being . Voiding spontaneously. No BM yet    O:  /79   Pulse 87   Temp 98.3  F (36.8  C) (Oral)   Resp 16   Ht 1.6 m (5' 3\")   Wt 97.5 kg (215 lb)   LMP 2021 (Approximate)   SpO2 97%   Breastfeeding Unknown   BMI 38.09 kg/m     Gen: healthy, alert and no distress    Resp: nonlabored breathing  Abd: soft, nondistended, appropriately TTP, FF at u  Ext: non-tender, no edema    Hemoglobin   Date Value Ref Range Status   2022 13.3 11.7 - 15.7 g/dL Final   2021 13.2 11.7 - 15.7 g/dL Final   2021 11.7 11.7 - 15.7 g/dL Final     Lab Results   Component Value Date    ABO O 2021    RH Pos 2021    AS Negative 2022    RUBELLAABIGG not immune 2020       A: 27 year old  PPD#1 s/p    P:   Routine postpartum cares.    Ambulation encouraged  Breast feeding strategies discussed  Lactation consultation  Pain control measures as needed  Reportable signs and symptoms dicussed with the patient  Discharge tomorrow due to toddler had a fever a few days ago and is at home with grandma right now      Reema Santana, DO   Obstetrics, Gynecology, and Infertility        "

## 2022-09-28 NOTE — LACTATION NOTE
"Lactation visit with Janneth, LAISHA, and baby girl.    Infant breastfeeding at time of visit. Janneth states infant breast feeds well when she wakeful but has been sleepy at times as well (infant still within first 24 hours). Infant is LGA, passed initial OT. Discussed a glucose check at infant's 24 hour owen and what the Peds expects glucose number to be. Also discussed in supplementation every becomes necessary, Janneth should pump to increase her production more quickly. Janneth has a new breast pump for home use.       Reviewed  breastfeeding basics:   1) Watch for early feeding cues (licking lips, stirring or rooting, sucking movement with mouth, hands to mouth) and always breast feed on DEMAND.  2) Infant should breastfeed a minimum of 8 times in 24 hours. If it has been 3 hours since last breast feeding session, un-swaddle infant and begin skin to skin to entice infant to nurse.    Reviewed breast feeding section in our \"Guide to Postpartum and [place] [facility].\" Highlighting page that educates to  feeding patterns/behavior: paying special attention to understanding infant's cluster-feeding (when and why's).     Discussed normal infant weight loss and when infant should be back to birth weight. Stressed the importance of continuing to track infant's feeds and void/stools patterns, at least until infant has returned to his birth weight.    Discussed pumping (when it's helpful, when it's necessary, and suggested to begin pumping around infant's one month of age after first morning breast-feed for building milk stash). Appreciative of visit.    Karley Pacheco RN, IBCLC            "

## 2022-09-29 VITALS
TEMPERATURE: 98.5 F | HEART RATE: 76 BPM | SYSTOLIC BLOOD PRESSURE: 128 MMHG | OXYGEN SATURATION: 97 % | RESPIRATION RATE: 16 BRPM | BODY MASS INDEX: 38.09 KG/M2 | DIASTOLIC BLOOD PRESSURE: 71 MMHG | HEIGHT: 63 IN | WEIGHT: 215 LBS

## 2022-09-29 PROCEDURE — 250N000013 HC RX MED GY IP 250 OP 250 PS 637: Performed by: OBSTETRICS & GYNECOLOGY

## 2022-09-29 RX ORDER — ACETAMINOPHEN 325 MG/1
650 TABLET ORAL EVERY 4 HOURS PRN
Qty: 100 TABLET | Refills: 0
Start: 2022-09-29 | End: 2022-11-22

## 2022-09-29 RX ORDER — HYDROCORTISONE 25 MG/G
CREAM TOPICAL 3 TIMES DAILY PRN
Qty: 30 G | Refills: 0 | Status: SHIPPED | OUTPATIENT
Start: 2022-09-29 | End: 2022-11-22

## 2022-09-29 RX ORDER — IBUPROFEN 400 MG/1
400 TABLET, FILM COATED ORAL EVERY 4 HOURS PRN
Status: DISCONTINUED | OUTPATIENT
Start: 2022-09-29 | End: 2022-09-29 | Stop reason: HOSPADM

## 2022-09-29 RX ORDER — DOCUSATE SODIUM 100 MG/1
100 CAPSULE, LIQUID FILLED ORAL 2 TIMES DAILY PRN
Qty: 100 CAPSULE | Refills: 0 | Status: SHIPPED | OUTPATIENT
Start: 2022-09-29

## 2022-09-29 RX ORDER — SIMETHICONE 80 MG
80 TABLET,CHEWABLE ORAL EVERY 6 HOURS PRN
Status: DISCONTINUED | OUTPATIENT
Start: 2022-09-29 | End: 2022-09-29 | Stop reason: HOSPADM

## 2022-09-29 RX ORDER — IBUPROFEN 400 MG/1
400 TABLET, FILM COATED ORAL EVERY 4 HOURS PRN
Start: 2022-09-29 | End: 2022-11-22

## 2022-09-29 RX ADMIN — DOCUSATE SODIUM 100 MG: 100 CAPSULE, LIQUID FILLED ORAL at 08:53

## 2022-09-29 RX ADMIN — ACETAMINOPHEN 650 MG: 325 TABLET, FILM COATED ORAL at 00:53

## 2022-09-29 RX ADMIN — IBUPROFEN 400 MG: 400 TABLET, FILM COATED ORAL at 08:53

## 2022-09-29 RX ADMIN — ACETAMINOPHEN 650 MG: 325 TABLET, FILM COATED ORAL at 08:54

## 2022-09-29 RX ADMIN — SIMETHICONE 80 MG: 80 TABLET, CHEWABLE ORAL at 08:53

## 2022-09-29 ASSESSMENT — ACTIVITIES OF DAILY LIVING (ADL)
ADLS_ACUITY_SCORE: 18

## 2022-09-29 NOTE — PLAN OF CARE
D: VSS, assessments WDL.   I: Pt. received complete discharge paperwork and home medications as filled by discharge pharmacy.  Pt. was given times of last dose for all discharge medications in writing on discharge medication sheets.  Discharge teaching included home medication, pain management, activity restrictions, postpartum cares, and signs and symptoms of infection.    A: Discharge outcomes on care plan met.  Mother states understanding and comfort with self and baby cares.  P: Pt. discharged to home.  Pt. was discharged with baby, and bands were checked at time of discharge.  Pt. was accompanied by , nurse and baby, and left with personal belongings.    Pt. to follow up with OB per MD order.  Pt. had no further questions at the time of discharge and no unmet needs were identified.

## 2022-09-29 NOTE — PLAN OF CARE
Vital signs stable. Postpartum assessment WDL. Pain controlled with Tylenol. Patient defers Ibuprofen because of stomach upset previously. Using Ice and tucks. . Patient voiding without difficulty. Breastfeeding on cue with  no assist. Patient and infant bonding well. Will continue with current plan of care.  Started pumping and supplement donor milk with a bottle. Went over the information about donor milk and patient agrees to use donor milk for the baby.

## 2022-09-29 NOTE — ANESTHESIA POSTPROCEDURE EVALUATION
Patient: Janneth Mcclain    Procedure: * No procedures listed *       Anesthesia Type:  Epidural    Note:  Disposition: Inpatient   Postop Pain Control: Uneventful            Sign Out: Well controlled pain   PONV: No   Neuro/Psych: Uneventful            Sign Out: Acceptable/Baseline neuro status   Airway/Respiratory: Uneventful            Sign Out: Acceptable/Baseline resp. status   CV/Hemodynamics: Uneventful            Sign Out: Acceptable CV status   Other NRE: NONE   DID A NON-ROUTINE EVENT OCCUR? No    Event details/Postop Comments:  No epidural related complaints           Last vitals:  Vitals:    09/28/22 1420 09/28/22 1556 09/28/22 1900   BP:  118/68    Pulse:  65    Resp: 16 16 16   Temp:  37  C (98.6  F)    SpO2:          Electronically Signed By: Cristo Hayden MD  September 28, 2022  11:02 PM

## 2022-09-29 NOTE — PROGRESS NOTES
"[facility] Obstetrics Post-Op Progress Note  2022 PPD #3    S: Doing well.  Pain is well controlled. Bleeding Light. Infant is being  and Bottlefed.  Ambulatory.  Tolerating regular diet. Voiding spontaneously. Passing gas, no BM yet..  Has been having gas pain.     O:  /69 (Patient Position: Semi-Nice's)   Pulse 65   Temp 98.4  F (36.9  C) (Oral)   Resp 16   Ht 1.6 m (5' 3\")   Wt 97.5 kg (215 lb)   LMP 2021 (Approximate)   SpO2 97%   Breastfeeding Unknown   BMI 38.09 kg/m     Gen: healthy, alert and no distress   Resp: Nonlabored breathing  Abd: soft, non-distended, appropriately TTP, FF at umb  Incision: C/D/I, staples in place   Ext: non-tender, trace  edema    Hemoglobin   Date Value Ref Range Status   2022 12.1 11.7 - 15.7 g/dL Final   2022 13.3 11.7 - 15.7 g/dL Final   2021 13.2 11.7 - 15.7 g/dL Final   2021 11.7 11.7 - 15.7 g/dL Final     Lab Results   Component Value Date    ABO O 2021    RH Pos 2021    AS Negative 2022    RUBELLAABIGG not immune 2020       A: 27 year old  PPD #3  Rubella Immune,   O+, no rhogam needed  P:   Routine post-operative care  Ambulation encouraged  Lactation consultation  Reportable signs and symptoms dicussed with the patient  Discharge home later today  Will try home later today, instructions given  Tara Veras MD   Obstetrics, Gynecology, and Infertility      "

## 2022-09-29 NOTE — PLAN OF CARE
VSS. Patient ambulating free of dizziness. Voiding without difficulty. Pain managed on tylenol and ibuprofen. Working on breastfeeding every 2-3 hours, pumping for LGA increased milk needs. Attentive to and bonding well with infant. Will continue to monitor.

## 2022-11-22 ENCOUNTER — E-VISIT (OUTPATIENT)
Dept: FAMILY MEDICINE | Facility: CLINIC | Age: 27
End: 2022-11-22
Payer: COMMERCIAL

## 2022-11-22 ENCOUNTER — TELEPHONE (OUTPATIENT)
Dept: FAMILY MEDICINE | Facility: CLINIC | Age: 27
End: 2022-11-22

## 2022-11-22 ENCOUNTER — NURSE TRIAGE (OUTPATIENT)
Dept: NURSING | Facility: CLINIC | Age: 27
End: 2022-11-22

## 2022-11-22 DIAGNOSIS — H66.90 EAR INFECTION: Primary | ICD-10-CM

## 2022-11-22 PROCEDURE — 99423 OL DIG E/M SVC 21+ MIN: CPT | Performed by: FAMILY MEDICINE

## 2022-11-22 RX ORDER — NEOMYCIN SULFATE, POLYMYXIN B SULFATE, HYDROCORTISONE 3.5; 10000; 1 MG/ML; [USP'U]/ML; MG/ML
3 SOLUTION/ DROPS AURICULAR (OTIC) 3 TIMES DAILY
Qty: 10 ML | Refills: 0 | Status: ON HOLD | OUTPATIENT
Start: 2022-11-22 | End: 2024-07-06

## 2022-11-22 RX ORDER — DOXYCYCLINE 100 MG/1
100 CAPSULE ORAL 2 TIMES DAILY
Qty: 14 CAPSULE | Refills: 0 | Status: SHIPPED | OUTPATIENT
Start: 2022-11-22 | End: 2022-11-23

## 2022-11-22 NOTE — TELEPHONE ENCOUNTER
"See triage call in separate encounter. Patient has ear pain in left ear. States that she and her kids have been sick the last 2 weeks. States that she has a new baby (9/27) and does not want to wait at a .    Please advise if OK for E visit for ear pain.    LOV 6/17/21 due to has 3 small children \"and has been pregnant most of the time.\"    Can we leave a detailed message on this number? YES  Phone number patient can be reached at: Cell number on file:    Telephone Information:   Mobile [phone]       Teresa Daley, RN  [facility] Triage      "

## 2022-11-22 NOTE — TELEPHONE ENCOUNTER
Reason for Call:  Other appointment    Detailed comments: same day or next day appointment for ear pain    Phone Number Patient can be reached at: Cell number on file:    Telephone Information:   Mobile [phone]       Best Time: anytime    Can we leave a detailed message on this number? YES    Call taken on 11/22/2022 at 9:50 AM by Sary Bustos

## 2022-11-22 NOTE — TELEPHONE ENCOUNTER
"Pt calling with ear pain.    Woke up last night with \"searing pain\" in left ear. Used ibuprofen and heating pad which helped a little.    Sounds are muffled in that ear, but denies hearing loss. Also has had cold symptoms with nasal congestion. Denies any fever or drainage from the ear. Can move neck normally and ear does not appear red, per pt.    Protocol recommends see in office today. Pt is leaving to go out of town at 2 pm, so would recommend going to  nearby to be evaluated for possible ear infection. Care advice and callback instructions reviewed. Pt verbalized understanding.    Lissette Clemente, RN, BSN  [facility]   Triage Nurse Advisor      Reason for Disposition    All other earaches  (Exceptions: Earache lasting < 1 hour, and earache from air travel.)    Additional Information    Negative: Pink or red swelling behind the ear    Negative: Stiff neck (can't touch chin to chest)    Negative: Patient sounds very sick or weak to the triager    Negative: Severe earache pain    Negative: Fever > 103 F (39.4 C)    Negative: Pointed object was inserted into the ear canal (e.g., a pencil, stick, or wire)    Negative: White, yellow, or green discharge    Negative: Diabetes mellitus or a weak immune system (e.g., HIV positive, cancer chemotherapy, transplant patient)    Negative: Bloody discharge or unexplained bleeding from ear canal    Negative: New blurred vision or vision changes    Protocols used: EARACHE-A-OH      "

## 2022-11-23 RX ORDER — CEFUROXIME AXETIL 500 MG/1
500 TABLET ORAL 2 TIMES DAILY
Qty: 20 TABLET | Refills: 0 | Status: ON HOLD | OUTPATIENT
Start: 2022-11-23 | End: 2024-07-06

## 2023-02-03 ENCOUNTER — OFFICE VISIT (OUTPATIENT)
Dept: FAMILY MEDICINE | Facility: CLINIC | Age: 28
End: 2023-02-03
Payer: COMMERCIAL

## 2023-02-03 VITALS
HEIGHT: 63 IN | RESPIRATION RATE: 14 BRPM | SYSTOLIC BLOOD PRESSURE: 114 MMHG | HEART RATE: 59 BPM | TEMPERATURE: 97.9 F | OXYGEN SATURATION: 98 % | BODY MASS INDEX: 30.9 KG/M2 | WEIGHT: 174.4 LBS | DIASTOLIC BLOOD PRESSURE: 75 MMHG

## 2023-02-03 DIAGNOSIS — Z23 NEED FOR MMR VACCINE: Primary | ICD-10-CM

## 2023-02-03 PROCEDURE — 90471 IMMUNIZATION ADMIN: CPT | Performed by: FAMILY MEDICINE

## 2023-02-03 PROCEDURE — 90707 MMR VACCINE SC: CPT | Performed by: FAMILY MEDICINE

## 2023-02-03 PROCEDURE — 99212 OFFICE O/P EST SF 10 MIN: CPT | Mod: 25 | Performed by: FAMILY MEDICINE

## 2023-02-03 ASSESSMENT — PAIN SCALES - GENERAL: PAINLEVEL: NO PAIN (0)

## 2023-02-03 NOTE — PROGRESS NOTES
"  Assessment & Plan     Need for MMR vaccine  Has low IgG titer, will have her to take MMR  - MMR, SUBQ (12+ MO)           BMI:   Estimated body mass index is 30.89 kg/m  as calculated from the following:    Height as of this encounter: 1.6 m (5' 3\").    Weight as of this encounter: 79.1 kg (174 lb 6.4 oz).   Weight management plan: Discussed healthy diet and exercise guidelines    FUTURE APPOINTMENTS:       - Follow-up visit in 6 months for CPE    No follow-ups on file.    Khurram Harrison MD  [facility] KARLA Lagunas is a 27 year old presenting for the following health issues:  No chief complaint on file.      History of Present Illness       Reason for visit:  Mmr vaccine    She eats 2-3 servings of fruits and vegetables daily.She consumes 0 sweetened beverage(s) daily.She exercises with enough effort to increase her heart rate 9 or less minutes per day.  She exercises with enough effort to increase her heart rate 3 or less days per week.   She is taking medications regularly.       Concern - need MMR  Onset: N/A  Description: N/A    She has low IG level at previous pregnancy, was suggested taking MMR vaccine, will have her to take today    Review of Systems   Constitutional, HEENT, cardiovascular, pulmonary, gi and gu systems are negative, except as otherwise noted.      Objective    /75   Pulse 59   Temp 97.9  F (36.6  C) (Temporal)   Resp 14   Ht 1.6 m (5' 3\")   Wt 79.1 kg (174 lb 6.4 oz)   SpO2 98%   BMI 30.89 kg/m    Body mass index is 30.89 kg/m .  Physical Exam   GENERAL: healthy, alert and no distress  NECK: no adenopathy, no asymmetry, masses, or scars and thyroid normal to palpation  RESP: lungs clear to auscultation - no rales, rhonchi or wheezes  CV: regular rate and rhythm, normal S1 S2, no S3 or S4, no murmur, click or rub, no peripheral edema and peripheral pulses strong  ABDOMEN: soft, nontender, no hepatosplenomegaly, no masses and bowel sounds " normal  MS: no gross musculoskeletal defects noted, no edema

## 2023-05-03 ENCOUNTER — ALLIED HEALTH/NURSE VISIT (OUTPATIENT)
Dept: FAMILY MEDICINE | Facility: CLINIC | Age: 28
End: 2023-05-03
Payer: COMMERCIAL

## 2023-05-03 DIAGNOSIS — Z23 ENCOUNTER FOR IMMUNIZATION: ICD-10-CM

## 2023-05-03 DIAGNOSIS — Z23 NEED FOR VACCINATION: Primary | ICD-10-CM

## 2023-05-03 PROCEDURE — 90471 IMMUNIZATION ADMIN: CPT

## 2023-05-03 PROCEDURE — 90707 MMR VACCINE SC: CPT

## 2023-05-03 PROCEDURE — 99207 PR NO CHARGE NURSE ONLY: CPT

## 2023-05-03 NOTE — PROGRESS NOTES
Prior to immunization administration, verified patients identity using patient s name and date of birth. Please see Immunization Activity for additional information.     Screening Questionnaire for Adult Immunization    Are you sick today?   No   Do you have allergies to medications, food, a vaccine component or latex?   Yes   Have you ever had a serious reaction after receiving a vaccination?   No   Do you have a long-term health problem with heart, lung, kidney, or metabolic disease (e.g., diabetes), asthma, a blood disorder, no spleen, complement component deficiency, a cochlear implant, or a spinal fluid leak?  Are you on long-term aspirin therapy?   No   Do you have cancer, leukemia, HIV/AIDS, or any other immune system problem?   No   Do you have a parent, brother, or sister with an immune system problem?   No   In the past 3 months, have you taken medications that affect  your immune system, such as prednisone, other steroids, or anticancer drugs; drugs for the treatment of rheumatoid arthritis, Crohn s disease, or psoriasis; or have you had radiation treatments?   No   Have you had a seizure, or a brain or other nervous system problem?   No   During the past year, have you received a transfusion of blood or blood    products, or been given immune (gamma) globulin or antiviral drug?   No   For women: Are you pregnant or is there a chance you could become       pregnant during the next month?   No   Have you received any vaccinations in the past 4 weeks?   No     Immunization questionnaire answers were all negative.    I have reviewed the following standing orders:   This patient is due and qualifies for the MMR vaccine.    Click here for full standing order document: MMR Adult Standing Order     I have reviewed the vaccines inclusion and exclusion criteria;No concerns regarding eligibility.         Injection of MMR given by Logan Perry MA. Patient instructed to remain in clinic for 15 minutes  afterwards, and to report any adverse reactions.     Screening performed by Logan Perry MA on 5/3/2023 at 8:29 AM.

## 2023-10-01 ENCOUNTER — TRANSFERRED RECORDS (OUTPATIENT)
Dept: MULTI SPECIALTY CLINIC | Facility: CLINIC | Age: 28
End: 2023-10-01

## 2023-10-01 LAB — PAP SMEAR - HIM PATIENT REPORTED: NORMAL

## 2023-10-08 ENCOUNTER — HEALTH MAINTENANCE LETTER (OUTPATIENT)
Age: 28
End: 2023-10-08

## 2023-10-27 LAB
HEPATITIS B SURFACE ANTIGEN (EXTERNAL): NEGATIVE
HIV1+2 AB SERPL QL IA: NEGATIVE
RUBELLA ANTIBODY IGG (EXTERNAL): NORMAL

## 2024-03-28 ENCOUNTER — E-VISIT (OUTPATIENT)
Dept: URGENT CARE | Facility: CLINIC | Age: 29
End: 2024-03-28
Payer: COMMERCIAL

## 2024-03-28 DIAGNOSIS — H10.30 ACUTE BACTERIAL CONJUNCTIVITIS, UNSPECIFIED LATERALITY: Primary | ICD-10-CM

## 2024-03-28 PROCEDURE — 99421 OL DIG E/M SVC 5-10 MIN: CPT | Performed by: NURSE PRACTITIONER

## 2024-03-28 RX ORDER — POLYMYXIN B SULFATE AND TRIMETHOPRIM 1; 10000 MG/ML; [USP'U]/ML
SOLUTION OPHTHALMIC
Qty: 10 ML | Refills: 0 | Status: SHIPPED | OUTPATIENT
Start: 2024-03-28 | End: 2024-04-04

## 2024-03-28 NOTE — PATIENT INSTRUCTIONS
Thank you for choosing us for your care. I have placed an order for a prescription so that you can start treatment. View your full visit summary for details by clicking on the link below. Your pharmacist will able to address any questions you may have about the medication.     If you re not feeling better within 2-3 days, please schedule an appointment.  You can schedule an appointment right here in [facility], or call [phone]  If the visit is for the same symptoms as your eVisit, we ll refund the cost of your eVisit if seen within seven days.    Pinkeye: Care Instructions  Overview     Pinkeye is redness and swelling of the eye surface and the conjunctiva (the lining of the eyelid and the covering of the white part of the eye). Pinkeye is also called conjunctivitis. Pinkeye is often caused by infection with bacteria or a virus. Dry air, allergies, smoke, and chemicals are other common causes.  Pinkeye often gets better on its own in 7 to 10 days. Antibiotics only help if the pinkeye is caused by bacteria. Pinkeye caused by infection spreads easily. If an allergy or chemical is causing pinkeye, it will not go away unless you can avoid whatever is causing it.  Follow-up care is a key part of your treatment and safety. Be sure to make and go to all appointments, and call your doctor if you are having problems. It's also a good idea to know your test results and keep a list of the medicines you take.  How can you care for yourself at home?  Wash your hands often. Always wash them before and after you treat pinkeye or touch your eyes or face.  Use moist cotton or a clean, wet cloth to remove crust. Wipe from the inside corner of the eye to the outside. Use a clean part of the cloth for each wipe.  Put cold or warm wet cloths on your eye a few times a day if the eye hurts.  Do not wear contact lenses or eye makeup until the pinkeye is gone. Throw away any eye makeup you were using when you got pinkeye. Clean your  "contacts and storage case. If you wear disposable contacts, use a new pair when your eye has cleared and it is safe to wear contacts again.  If the doctor gave you antibiotic ointment or eyedrops, use them as directed. Use the medicine for as long as instructed, even if your eye starts looking better soon. Keep the bottle tip clean, and do not let it touch the eye area.  To put in eyedrops or ointment:  Tilt your head back, and pull your lower eyelid down with one finger.  Drop or squirt the medicine inside the lower lid.  Close your eye for 30 to 60 seconds to let the drops or ointment move around.  Do not touch the ointment or dropper tip to your eyelashes or any other surface.  Do not share towels, pillows, or washcloths while you have pinkeye.  When should you call for help?   Call your doctor now or seek immediate medical care if:    You have pain in your eye, not just irritation on the surface.     You have a change in vision or loss of vision.     You have an increase in discharge from the eye.     Your eye has not started to improve or begins to get worse within 48 hours after you start using antibiotics.     Pinkeye lasts longer than 7 days.   Watch closely for changes in your health, and be sure to contact your doctor if you have any problems.  Where can you learn more?  Go to https://www.[employer].net/patiented  Enter Y392 in the search box to learn more about \"Pinkeye: Care Instructions.\"  Current as of: June 5, 2023               Content Version: 14.0    8104-8684 [employer].   Care instructions adapted under license by your healthcare professional. If you have questions about a medical condition or this instruction, always ask your healthcare professional. [employer] disclaims any warranty or liability for your use of this information.      "

## 2024-07-06 ENCOUNTER — HOSPITAL ENCOUNTER (INPATIENT)
Facility: CLINIC | Age: 29
LOS: 1 days | Discharge: HOME | End: 2024-07-07
Attending: OBSTETRICS & GYNECOLOGY | Admitting: OBSTETRICS & GYNECOLOGY
Payer: COMMERCIAL

## 2024-07-06 PROBLEM — Z36.89 ENCOUNTER FOR TRIAGE IN PREGNANT PATIENT: Status: ACTIVE | Noted: 2022-09-01

## 2024-07-06 LAB
ABO/RH(D): NORMAL
ANTIBODY SCREEN: NEGATIVE
HGB BLD-MCNC: 14.4 G/DL (ref 11.7–15.7)
SPECIMEN EXPIRATION DATE: NORMAL
T PALLIDUM AB SER QL: NONREACTIVE

## 2024-07-06 PROCEDURE — G0463 HOSPITAL OUTPT CLINIC VISIT: HCPCS

## 2024-07-06 PROCEDURE — 120N000012 HC R&B POSTPARTUM

## 2024-07-06 PROCEDURE — 85018 HEMOGLOBIN: CPT | Performed by: OBSTETRICS & GYNECOLOGY

## 2024-07-06 PROCEDURE — 250N000009 HC RX 250: Performed by: OBSTETRICS & GYNECOLOGY

## 2024-07-06 PROCEDURE — 250N000013 HC RX MED GY IP 250 OP 250 PS 637: Performed by: OBSTETRICS & GYNECOLOGY

## 2024-07-06 PROCEDURE — 722N000001 HC LABOR CARE VAGINAL DELIVERY SINGLE

## 2024-07-06 PROCEDURE — 250N000011 HC RX IP 250 OP 636: Performed by: OBSTETRICS & GYNECOLOGY

## 2024-07-06 PROCEDURE — 258N000003 HC RX IP 258 OP 636: Performed by: OBSTETRICS & GYNECOLOGY

## 2024-07-06 PROCEDURE — 86900 BLOOD TYPING SEROLOGIC ABO: CPT | Performed by: OBSTETRICS & GYNECOLOGY

## 2024-07-06 PROCEDURE — 86780 TREPONEMA PALLIDUM: CPT | Performed by: OBSTETRICS & GYNECOLOGY

## 2024-07-06 RX ORDER — MISOPROSTOL 200 UG/1
400 TABLET ORAL
Status: DISCONTINUED | OUTPATIENT
Start: 2024-07-06 | End: 2024-07-06 | Stop reason: HOSPADM

## 2024-07-06 RX ORDER — NALOXONE HYDROCHLORIDE 0.4 MG/ML
0.2 INJECTION, SOLUTION INTRAMUSCULAR; INTRAVENOUS; SUBCUTANEOUS
Status: DISCONTINUED | OUTPATIENT
Start: 2024-07-06 | End: 2024-07-06 | Stop reason: HOSPADM

## 2024-07-06 RX ORDER — MISOPROSTOL 200 UG/1
800 TABLET ORAL
Status: DISCONTINUED | OUTPATIENT
Start: 2024-07-06 | End: 2024-07-06 | Stop reason: HOSPADM

## 2024-07-06 RX ORDER — METOCLOPRAMIDE 10 MG/1
10 TABLET ORAL EVERY 6 HOURS PRN
Status: DISCONTINUED | OUTPATIENT
Start: 2024-07-06 | End: 2024-07-06 | Stop reason: HOSPADM

## 2024-07-06 RX ORDER — BUPIVACAINE HYDROCHLORIDE 2.5 MG/ML
1 INJECTION, SOLUTION EPIDURAL; INFILTRATION; INTRACAUDAL ONCE
Status: DISCONTINUED | OUTPATIENT
Start: 2024-07-06 | End: 2024-07-06 | Stop reason: HOSPADM

## 2024-07-06 RX ORDER — MISOPROSTOL 200 UG/1
400 TABLET ORAL
Status: DISCONTINUED | OUTPATIENT
Start: 2024-07-06 | End: 2024-07-07 | Stop reason: HOSPADM

## 2024-07-06 RX ORDER — ONDANSETRON 2 MG/ML
4 INJECTION INTRAMUSCULAR; INTRAVENOUS EVERY 6 HOURS PRN
Status: DISCONTINUED | OUTPATIENT
Start: 2024-07-06 | End: 2024-07-06 | Stop reason: HOSPADM

## 2024-07-06 RX ORDER — ACETAMINOPHEN 325 MG/1
650 TABLET ORAL EVERY 4 HOURS PRN
Status: DISCONTINUED | OUTPATIENT
Start: 2024-07-06 | End: 2024-07-07 | Stop reason: HOSPADM

## 2024-07-06 RX ORDER — ONDANSETRON 4 MG/1
4 TABLET, ORALLY DISINTEGRATING ORAL EVERY 6 HOURS PRN
Status: DISCONTINUED | OUTPATIENT
Start: 2024-07-06 | End: 2024-07-06 | Stop reason: HOSPADM

## 2024-07-06 RX ORDER — LOPERAMIDE HCL 2 MG
4 CAPSULE ORAL
Status: DISCONTINUED | OUTPATIENT
Start: 2024-07-06 | End: 2024-07-07 | Stop reason: HOSPADM

## 2024-07-06 RX ORDER — ACETAMINOPHEN 325 MG/1
650 TABLET ORAL EVERY 4 HOURS PRN
Status: DISCONTINUED | OUTPATIENT
Start: 2024-07-06 | End: 2024-07-06 | Stop reason: HOSPADM

## 2024-07-06 RX ORDER — MISOPROSTOL 200 UG/1
800 TABLET ORAL
Status: DISCONTINUED | OUTPATIENT
Start: 2024-07-06 | End: 2024-07-07 | Stop reason: HOSPADM

## 2024-07-06 RX ORDER — BISACODYL 10 MG
10 SUPPOSITORY, RECTAL RECTAL DAILY PRN
Status: DISCONTINUED | OUTPATIENT
Start: 2024-07-06 | End: 2024-07-07 | Stop reason: HOSPADM

## 2024-07-06 RX ORDER — OXYTOCIN/0.9 % SODIUM CHLORIDE 30/500 ML
340 PLASTIC BAG, INJECTION (ML) INTRAVENOUS CONTINUOUS PRN
Status: DISCONTINUED | OUTPATIENT
Start: 2024-07-06 | End: 2024-07-07 | Stop reason: HOSPADM

## 2024-07-06 RX ORDER — FENTANYL CITRATE 50 UG/ML
25 INJECTION, SOLUTION INTRAMUSCULAR; INTRAVENOUS ONCE
Status: DISCONTINUED | OUTPATIENT
Start: 2024-07-06 | End: 2024-07-06 | Stop reason: HOSPADM

## 2024-07-06 RX ORDER — OXYTOCIN 10 [USP'U]/ML
10 INJECTION, SOLUTION INTRAMUSCULAR; INTRAVENOUS
Status: DISCONTINUED | OUTPATIENT
Start: 2024-07-06 | End: 2024-07-07 | Stop reason: HOSPADM

## 2024-07-06 RX ORDER — CARBOPROST TROMETHAMINE 250 UG/ML
250 INJECTION, SOLUTION INTRAMUSCULAR
Status: DISCONTINUED | OUTPATIENT
Start: 2024-07-06 | End: 2024-07-07 | Stop reason: HOSPADM

## 2024-07-06 RX ORDER — METHYLERGONOVINE MALEATE 0.2 MG/ML
200 INJECTION INTRAVENOUS
Status: DISCONTINUED | OUTPATIENT
Start: 2024-07-06 | End: 2024-07-06 | Stop reason: HOSPADM

## 2024-07-06 RX ORDER — LOPERAMIDE HCL 2 MG
2 CAPSULE ORAL
Status: DISCONTINUED | OUTPATIENT
Start: 2024-07-06 | End: 2024-07-07 | Stop reason: HOSPADM

## 2024-07-06 RX ORDER — IBUPROFEN 400 MG/1
800 TABLET, FILM COATED ORAL EVERY 6 HOURS PRN
Status: DISCONTINUED | OUTPATIENT
Start: 2024-07-06 | End: 2024-07-07 | Stop reason: HOSPADM

## 2024-07-06 RX ORDER — FENTANYL CITRATE 50 UG/ML
100 INJECTION, SOLUTION INTRAMUSCULAR; INTRAVENOUS
Status: DISCONTINUED | OUTPATIENT
Start: 2024-07-06 | End: 2024-07-06 | Stop reason: HOSPADM

## 2024-07-06 RX ORDER — ROPIVACAINE HYDROCHLORIDE 2 MG/ML
10 INJECTION, SOLUTION EPIDURAL; INFILTRATION; PERINEURAL ONCE
Status: DISCONTINUED | OUTPATIENT
Start: 2024-07-06 | End: 2024-07-06 | Stop reason: HOSPADM

## 2024-07-06 RX ORDER — LIDOCAINE 40 MG/G
CREAM TOPICAL
Status: DISCONTINUED | OUTPATIENT
Start: 2024-07-06 | End: 2024-07-06 | Stop reason: HOSPADM

## 2024-07-06 RX ORDER — OXYTOCIN/0.9 % SODIUM CHLORIDE 30/500 ML
340 PLASTIC BAG, INJECTION (ML) INTRAVENOUS CONTINUOUS PRN
Status: DISCONTINUED | OUTPATIENT
Start: 2024-07-06 | End: 2024-07-06 | Stop reason: HOSPADM

## 2024-07-06 RX ORDER — NALBUPHINE HYDROCHLORIDE 20 MG/ML
2.5-5 INJECTION, SOLUTION INTRAMUSCULAR; INTRAVENOUS; SUBCUTANEOUS EVERY 6 HOURS PRN
Status: DISCONTINUED | OUTPATIENT
Start: 2024-07-06 | End: 2024-07-07 | Stop reason: HOSPADM

## 2024-07-06 RX ORDER — KETOROLAC TROMETHAMINE 30 MG/ML
30 INJECTION, SOLUTION INTRAMUSCULAR; INTRAVENOUS
Status: COMPLETED | OUTPATIENT
Start: 2024-07-06 | End: 2024-07-06

## 2024-07-06 RX ORDER — NALOXONE HYDROCHLORIDE 0.4 MG/ML
0.4 INJECTION, SOLUTION INTRAMUSCULAR; INTRAVENOUS; SUBCUTANEOUS
Status: DISCONTINUED | OUTPATIENT
Start: 2024-07-06 | End: 2024-07-06 | Stop reason: HOSPADM

## 2024-07-06 RX ORDER — LOPERAMIDE HCL 2 MG
4 CAPSULE ORAL
Status: DISCONTINUED | OUTPATIENT
Start: 2024-07-06 | End: 2024-07-06 | Stop reason: HOSPADM

## 2024-07-06 RX ORDER — HYDROCORTISONE 25 MG/G
CREAM TOPICAL 3 TIMES DAILY PRN
Status: DISCONTINUED | OUTPATIENT
Start: 2024-07-06 | End: 2024-07-07 | Stop reason: HOSPADM

## 2024-07-06 RX ORDER — TRANEXAMIC ACID 10 MG/ML
1 INJECTION, SOLUTION INTRAVENOUS EVERY 30 MIN PRN
Status: DISCONTINUED | OUTPATIENT
Start: 2024-07-06 | End: 2024-07-07 | Stop reason: HOSPADM

## 2024-07-06 RX ORDER — CARBOPROST TROMETHAMINE 250 UG/ML
250 INJECTION, SOLUTION INTRAMUSCULAR
Status: DISCONTINUED | OUTPATIENT
Start: 2024-07-06 | End: 2024-07-06 | Stop reason: HOSPADM

## 2024-07-06 RX ORDER — EPHEDRINE SULFATE 50 MG/ML
5 INJECTION, SOLUTION INTRAMUSCULAR; INTRAVENOUS; SUBCUTANEOUS
Status: DISCONTINUED | OUTPATIENT
Start: 2024-07-06 | End: 2024-07-06 | Stop reason: HOSPADM

## 2024-07-06 RX ORDER — DOCUSATE SODIUM 100 MG/1
100 CAPSULE, LIQUID FILLED ORAL DAILY
Status: DISCONTINUED | OUTPATIENT
Start: 2024-07-06 | End: 2024-07-07 | Stop reason: HOSPADM

## 2024-07-06 RX ORDER — PROCHLORPERAZINE 25 MG
25 SUPPOSITORY, RECTAL RECTAL EVERY 12 HOURS PRN
Status: DISCONTINUED | OUTPATIENT
Start: 2024-07-06 | End: 2024-07-06 | Stop reason: HOSPADM

## 2024-07-06 RX ORDER — OXYTOCIN 10 [USP'U]/ML
10 INJECTION, SOLUTION INTRAMUSCULAR; INTRAVENOUS
Status: DISCONTINUED | OUTPATIENT
Start: 2024-07-06 | End: 2024-07-06 | Stop reason: HOSPADM

## 2024-07-06 RX ORDER — FENTANYL CITRATE-0.9 % NACL/PF 10 MCG/ML
100 PLASTIC BAG, INJECTION (ML) INTRAVENOUS EVERY 5 MIN PRN
Status: DISCONTINUED | OUTPATIENT
Start: 2024-07-06 | End: 2024-07-06 | Stop reason: HOSPADM

## 2024-07-06 RX ORDER — PROCHLORPERAZINE MALEATE 5 MG
10 TABLET ORAL EVERY 6 HOURS PRN
Status: DISCONTINUED | OUTPATIENT
Start: 2024-07-06 | End: 2024-07-06 | Stop reason: HOSPADM

## 2024-07-06 RX ORDER — IBUPROFEN 400 MG/1
800 TABLET, FILM COATED ORAL
Status: COMPLETED | OUTPATIENT
Start: 2024-07-06 | End: 2024-07-06

## 2024-07-06 RX ORDER — SODIUM CHLORIDE, SODIUM LACTATE, POTASSIUM CHLORIDE, CALCIUM CHLORIDE 600; 310; 30; 20 MG/100ML; MG/100ML; MG/100ML; MG/100ML
INJECTION, SOLUTION INTRAVENOUS CONTINUOUS
Status: DISCONTINUED | OUTPATIENT
Start: 2024-07-06 | End: 2024-07-06 | Stop reason: HOSPADM

## 2024-07-06 RX ORDER — TRANEXAMIC ACID 10 MG/ML
1 INJECTION, SOLUTION INTRAVENOUS EVERY 30 MIN PRN
Status: DISCONTINUED | OUTPATIENT
Start: 2024-07-06 | End: 2024-07-06 | Stop reason: HOSPADM

## 2024-07-06 RX ORDER — LOPERAMIDE HCL 2 MG
2 CAPSULE ORAL
Status: DISCONTINUED | OUTPATIENT
Start: 2024-07-06 | End: 2024-07-06 | Stop reason: HOSPADM

## 2024-07-06 RX ORDER — METOCLOPRAMIDE HYDROCHLORIDE 5 MG/ML
10 INJECTION INTRAMUSCULAR; INTRAVENOUS EVERY 6 HOURS PRN
Status: DISCONTINUED | OUTPATIENT
Start: 2024-07-06 | End: 2024-07-06 | Stop reason: HOSPADM

## 2024-07-06 RX ORDER — OXYTOCIN/0.9 % SODIUM CHLORIDE 30/500 ML
100-340 PLASTIC BAG, INJECTION (ML) INTRAVENOUS CONTINUOUS PRN
Status: DISCONTINUED | OUTPATIENT
Start: 2024-07-06 | End: 2024-07-07 | Stop reason: HOSPADM

## 2024-07-06 RX ORDER — CITRIC ACID/SODIUM CITRATE 334-500MG
30 SOLUTION, ORAL ORAL
Status: DISCONTINUED | OUTPATIENT
Start: 2024-07-06 | End: 2024-07-06 | Stop reason: HOSPADM

## 2024-07-06 RX ORDER — METHYLERGONOVINE MALEATE 0.2 MG/ML
200 INJECTION INTRAVENOUS
Status: DISCONTINUED | OUTPATIENT
Start: 2024-07-06 | End: 2024-07-07 | Stop reason: HOSPADM

## 2024-07-06 RX ORDER — MODIFIED LANOLIN
OINTMENT (GRAM) TOPICAL
Status: DISCONTINUED | OUTPATIENT
Start: 2024-07-06 | End: 2024-07-07 | Stop reason: HOSPADM

## 2024-07-06 RX ADMIN — LIDOCAINE HYDROCHLORIDE 20 ML: 10 INJECTION, SOLUTION EPIDURAL; INFILTRATION; INTRACAUDAL; PERINEURAL at 03:09

## 2024-07-06 RX ADMIN — IBUPROFEN 800 MG: 400 TABLET ORAL at 19:10

## 2024-07-06 RX ADMIN — METHYLERGONOVINE MALEATE 200 MCG: 0.2 INJECTION INTRAVENOUS at 03:48

## 2024-07-06 RX ADMIN — SODIUM CHLORIDE, POTASSIUM CHLORIDE, SODIUM LACTATE AND CALCIUM CHLORIDE 1000 ML: 600; 310; 30; 20 INJECTION, SOLUTION INTRAVENOUS at 02:35

## 2024-07-06 RX ADMIN — TRANEXAMIC ACID 1 G: 10 INJECTION, SOLUTION INTRAVENOUS at 03:46

## 2024-07-06 RX ADMIN — Medication 340 ML/HR: at 03:08

## 2024-07-06 RX ADMIN — IBUPROFEN 800 MG: 400 TABLET ORAL at 05:50

## 2024-07-06 RX ADMIN — IBUPROFEN 800 MG: 400 TABLET ORAL at 12:10

## 2024-07-06 RX ADMIN — ACETAMINOPHEN 650 MG: 325 TABLET, FILM COATED ORAL at 12:10

## 2024-07-06 RX ADMIN — DOCUSATE SODIUM 100 MG: 100 CAPSULE, LIQUID FILLED ORAL at 07:55

## 2024-07-06 RX ADMIN — ACETAMINOPHEN 650 MG: 325 TABLET, FILM COATED ORAL at 05:50

## 2024-07-06 RX ADMIN — ACETAMINOPHEN 650 MG: 325 TABLET, FILM COATED ORAL at 19:10

## 2024-07-06 ASSESSMENT — ACTIVITIES OF DAILY LIVING (ADL)
ADLS_ACUITY_SCORE: 18

## 2024-07-06 NOTE — L&D DELIVERY NOTE
OB Delivery Summary (In-House)    Called as in-house MD to stand-by for delivery for this patient. Patient sat up for epidural and felt urge to push, baby at +2 station. Primary MD was en route.     On my exam vertex was +4 station. Over the next contraction she quickly brought the vertex to  and delivered a viable male infant at 3:04am on 24. After delivery of the head, the anterior shoulder and body delivered without difficulty.  A double nuchal cord was noted and reduced prior to delivery of the shoulders. The infant was placed on the mother's chest.  Delayed cord clamping was performed.        THIRD STAGE OF LABOR:  The cord was clamped and cut. The placenta then delivered spontaneously and appeared intact with a 3-vessel cord shortly after.  Pitocin was started and fundal massage was performed.  Perineum was inspected and no laceration was noted. Quantitative blood loss for the delivery was 150.       Both mother and baby tolerated delivery well and there were no complications. Fetal weight was 9lb1oz and Apgars were 8 and 9 at 1 and 5 minutes, respectively.       Manuela Cline MD  2024  3:20 AM

## 2024-07-06 NOTE — H&P
[facility] Labor and Delivery History and Physical    Janneth Brice MRN# [id number]   Age: 28 year old YOB: 1995     Date of Admission:  2024    Primary care provider: [facility] [place] [facility]  Primary clinic: Obstetrics, Gynecology, and Infertility           HPI:   Janneth Brice is a 28 year old  at 39w6d by LMP c/w early US admitted for SROM and labor.            Pregnancy history:     OBSTETRIC HISTORY:  OB History    Para Term  AB Living   5 2 2 0 2 2   SAB IAB Ectopic Multiple Live Births   2 0 0 0 2      # Outcome Date GA Lbr Bob/2nd Weight Sex Type Anes PTL Lv   5 Current            4 Term 22 39w6d 02:05 / 00:11 4.1 kg (9 lb 0.6 oz) F  EPI N RAUL      Name: BABSFEMALE-JANNETH      Apgar1: 2  Apgar5: 9   3 Term 21 39w1d 03:20 / 02:09 3.86 kg (8 lb 8.2 oz) M Vag-Spont EPI N RAUL      Name: CRISTIN BRICE-AJNNETH      Apgar1: 9  Apgar5: 9   2 SAB 2020           1 2019               EDC: Estimated Date of Delivery: 2024    Complications: none  Patient Active Problem List   Diagnosis    Indication for care in labor or delivery    Hypertension affecting pregnancy    Pregnancy    Encounter for triage in pregnant patient    Indication for care in labor and delivery, antepartum       Prenatal Labs:   Lab Results   Component Value Date    ABO O 2021    RH Pos 2021    AS Negative 2022    HEPBANG neg 2020    RUBELLAABIGG not immune 2020    HGB 12.1 2022       GBS Status:   Lab Results   Component Value Date    GBS Negative 2022       Ultrasounds:  Normal anatomy screen        Maternal Past Medical History:     Past Medical History:   Diagnosis Date    Depressive disorder     Therapist for anxiety currently     Past Surgical History:   Procedure Laterality Date    AS REMOVAL ADENOIDS,PRIMARY,<13 Y/O      DILATION AND CURETTAGE  2019; 3/2020      Medications Prior to Admission   Medication Sig  Dispense Refill Last Dose    fish oil-omega-3 fatty acids 1000 MG capsule Take 2 g by mouth daily   2024    Prenatal-FeFum-FA-DHA w/o A (PRENATAL + DHA PO) Take 1 tablet by mouth daily   2024    vitamin B-Complex Take 1 tablet by mouth daily   2024    vitamin D2 (ERGOCALCIFEROL) 70438 units (1250 mcg) capsule Take 50,000 Units by mouth once a week   2024    cefuroxime (CEFTIN) 500 MG tablet Take 1 tablet (500 mg) by mouth 2 times daily (Patient not taking: Reported on 2/3/2023) 20 tablet 0     docusate sodium (COLACE) 100 MG capsule Take 1 capsule (100 mg) by mouth 2 times daily as needed for constipation (Patient not taking: Reported on 2/3/2023) 100 capsule 0     neomycin-polymyxin-hydrocortisone (CORTISPORIN) 3.5-46413-4 otic solution Place 3 drops in ear(s) 3 times daily (Patient not taking: Reported on 2/3/2023) 10 mL 0            Family History:   The family history includes Pancreatic Cancer in her maternal grandfather.          Social History:     Social History     Tobacco Use    Smoking status: Never    Smokeless tobacco: Never   Substance Use Topics    Alcohol use: Not Currently            Review of Systems:   The Review of Systems is negative other than noted in the HPI          Physical Exam:   Patient Vitals for the past 8 hrs:   BP Temp Temp src Weight   24 0125 118/81 97.3  F (36.3  C) Temporal 95.3 kg (210 lb)     Gen: Well appearing  CV: WWP  Resp: Nonlabored breathing  Abd: Gravid c/w gest age  Ext: nontender no edema    Cervix: 4/80/-2  Membranes: SROM clear  EFW: 8 lb  Presentation:Cephalic    NST  Fetal Heart Rate Tracing:   Baseline 135  Variability: moderate  Accelerations: Present  Decelerations: None  Interpretation: reactive    Contractions: q 2 min per toco        Assessment:   Janneth Mcclain is a 28 year old  at 39w6d admitted for SROM and labor.        Plan:   1.  attended by in house MD, see note  2. Fetal wellbeing: Category I    Mirna Desir,  MD   Obstetrics, Gynecology, and Infertility

## 2024-07-06 NOTE — PROGRESS NOTES
[facility] Obstetrics Postpartum Progress Note  2024     S: Pt doing well. Pain is well controlled. Bleeding Moderate. Infant is being . Voiding spontaneously. No BM yet    O:  /73   Temp 98  F (36.7  C) (Oral)   Wt 95.3 kg (210 lb)   LMP 10/01/2023   Breastfeeding Unknown   BMI 37.20 kg/m     Gen: healthy, alert, and no distress    Resp: nonlabored breathing  Abd: soft, nondistended, appropriately TTP, FF at u  Ext: non-tender, no edema    Hemoglobin   Date Value Ref Range Status   2024 14.4 11.7 - 15.7 g/dL Final   2022 12.1 11.7 - 15.7 g/dL Final   2021 13.2 11.7 - 15.7 g/dL Final   2021 11.7 11.7 - 15.7 g/dL Final     Lab Results   Component Value Date    ABO O 2021    RH Pos 2021    AS Negative 2024    RUBELLAABIGG not immune 2020       A: 28 year old  PPD#0 s/p    P:   Routine postpartum cares.    Lactation consultation  Pain control measures as needed  Reportable signs and symptoms dicussed with the patient  Anticipate discharge tomorrow and order done      Reema Santana, DO   Obstetrics, Gynecology, and Infertility

## 2024-07-06 NOTE — PLAN OF CARE
Goal Outcome Evaluation:      Plan of Care Reviewed With: patient, spouse    Overall Patient Progress: improvingOverall Patient Progress: improving       Vital signs stable. Postpartum assessment WDL. Pain controlled with Tylenol and Ibuprofen. Patient voiding without difficulty. Breastfeeding on cue with minimal assist. Patient and infant bonding well. Will continue with current plan of care.

## 2024-07-06 NOTE — LACTATION NOTE
Initial visit with LAISHA Lagunas and baby.   her other 2 children well.    Breastfeeding general information reviewed.   Advised to breastfeed exclusively, on demand, avoid pacifiers, bottles and formula unless medically indicated.  Encouraged rooming in, skin to skin, feeding on demand 8-12x/day or sooner if baby cues.  Explained benefits of holding and skin to skin.  Encouraged lots of skin to skin. Instructed on hand expression. Has a breast pump for home.  Outpatient resources reviewed.    Continues to nurse well per mom. No further questions at this time.   Will follow as needed.   Moira Abrams BSN, RN, PHN, RNC-MNN, IBCLC

## 2024-07-06 NOTE — PLAN OF CARE
Goal Outcome Evaluation:       Patient arrived on floor via wheelchair with baby in arms. Safety and education reviewed. Vitals stable. Scant flow.                   Finasteride Counseling:  I discussed with the patient the risks of use of finasteride including but not limited to decreased libido, decreased ejaculate volume, gynecomastia, and depression. Women should not handle medication.  All of the patient's questions and concerns were addressed. Finasteride Male Counseling: Finasteride Counseling:  I discussed with the patient the risks of use of finasteride including but not limited to decreased libido, decreased ejaculate volume, gynecomastia, and depression. Women should not handle medication.  All of the patient's questions and concerns were addressed.

## 2024-07-06 NOTE — PLAN OF CARE
Data: [facility] presented to [facility]: 2024  1:13 AM.  Reason for maternal/fetal assessment is uterine contractions and leaking vaginal fluid. Patient reports she has started feeling more intense contractions tonight around 9pm. Pt reports that she felt like her water broke right after called MD for contractions. Patient denies vaginal bleeding, epigastric or RUQ pain. Patient reports fetal movement is normal. Patient is a 39w6d .  Prenatal record reviewed. Pregnancy has been uncomplicated.    Vital signs wnl. Support person is present.     Action: Verbal consent for EFM. Triage assessment completed.ROM + collect and SVE 4.5/80%/-2.     Response: Patient verbalized agreement with plan. Will contact Dr. Mercado with update and further orders.

## 2024-07-06 NOTE — PLAN OF CARE
0141: Dr Desir paged and updated on pt's arrival SVE, CXT 4-5 min, possible SROM for clear fluid, and pt would like epidural for pain management. Orders received to admit patient.     Pt ambulatory and in 218 at 0152. Pt sitting on edge of bed while RN's attempt to start PIV. Attempting to keep baby on monitoring at this time. L&D Rns unable to start IV with 3 attempts. IV team paged for start. IV at bedside at 0224. Attempted 4 more IV starts and was able to get PIV in L) AC. LR start immediately for epidural. Pt's contractions growing more intense during attempts to start PIV.     MDA paged at 0245 for placement. SVE at 0247, 6cm. Pt sat back up for epidural placement. SROM at 0257 and pt immediately felt the need to push. Unable to start epidural process. Back undraped and pt placed on back SVE 10/ +2. At 0259 Pt involuntary pushing. MD called for delivery and in-house called. Dr. Cline at bedside at 0301.     Pt delivered a viable baby boy at 0304.       Pt having moderate amount of bleeding x2 and blood clot x1, fundus firm at the U. MD updated at 0344. Orders to give TXA and methergine.     Fundus remains firm at the U, light amount bleeding throughout the rest of recover with one more episode of a blood clot.     Pt up and ambulating to bathroom, unable to void. Bladder scanned for 130. Encouraged to drink water and will try again on PP.     Pt transferred to room 426 with infant in hand. Pt voided 250mL. Fundus firm at the U with light amount of bleeding. Report given to Nieves MILTON RN.

## 2024-07-07 VITALS
DIASTOLIC BLOOD PRESSURE: 62 MMHG | BODY MASS INDEX: 35.97 KG/M2 | TEMPERATURE: 98 F | RESPIRATION RATE: 16 BRPM | WEIGHT: 203.04 LBS | SYSTOLIC BLOOD PRESSURE: 120 MMHG

## 2024-07-07 PROCEDURE — 250N000013 HC RX MED GY IP 250 OP 250 PS 637: Performed by: OBSTETRICS & GYNECOLOGY

## 2024-07-07 RX ADMIN — DOCUSATE SODIUM 100 MG: 100 CAPSULE, LIQUID FILLED ORAL at 11:27

## 2024-07-07 ASSESSMENT — ACTIVITIES OF DAILY LIVING (ADL)
ADLS_ACUITY_SCORE: 18

## 2024-07-07 NOTE — DISCHARGE INSTRUCTIONS
Warning Signs after Having a Baby    Keep this paper on your fridge or somewhere else where you can see it.    Call your provider if you have any of these symptoms up to 12 weeks after having your baby.    Thoughts of hurting yourself or your baby  Pain in your chest or trouble breathing  Severe headache not helped by pain medicine  Eyesight concerns (blurry vision, seeing spots or flashes of light, other changes to eyesight)  Fainting, shaking or other signs of a seizure    Call 9-1-1 if you feel that it is an emergency.     The symptoms below can happen to anyone after giving birth. They can be very serious. Call your provider if you have any of these warning signs.    My provider s phone number: _______________________    Losing too much blood (hemorrhage)    Call your provider if you soak through a pad in less than an hour or pass blood clots bigger than a golf ball. These may be signs that you are bleeding too much.    Blood clots in the legs or lungs    After you give birth, your body naturally clots its blood to help prevent blood loss. Sometimes this increased clotting can happen in other areas of the body, like the legs or lungs. This can block your blood flow and be very dangerous.     Call your provider if you:  Have a red, swollen spot on the back of your leg that is warm or painful when you touch it.   Are coughing up blood.     Infection    Call your provider if you have any of these symptoms:  Fever of 100.4 F (38 C) or higher.  Pain or redness around your stitches if you had an incision.   Any yellow, white, or green fluid coming from places where you had stitches or surgery.    Mood Problems (postpartum depression)    Many people feel sad or have mood changes after having a baby. But for some people, these mood swings are worse.     Call your provider right away if you feel so anxious or nervous that you can't care for yourself or your baby.    Preeclampsia (high blood pressure)    Even if you  "didn't have high blood pressure when you were pregnant, you are at risk for the high blood pressure disease called preeclampsia. This risk can last up to 12 weeks after giving birth.     Call your provider if you have:   Pain on your right side under your rib cage  Sudden swelling in the hands and face    Remember: You know your body. If something doesn't feel right, get medical help.     For informational purposes only. Not to replace the advice of your health care provider. Copyright 2020 [facility]. All rights reserved. Clinically reviewed by Lucy Mosher, RNC-OB, MSN. [employer] [id number] - Rev .  Postpartum Care at Home With Your Baby: Care Instructions  Overview     After childbirth (postpartum period), your body goes through many changes as you recover. In these weeks after delivery, try to take good care of yourself. Get rest whenever you can and accept help from others.  It may take 4 to 6 weeks to feel like yourself again, and possibly longer if you had a  birth. You may feel sore or very tired as you recover. After delivery, you may continue to have contractions as the uterus returns to the size it was before your pregnancy. You will also have some vaginal bleeding. And you may have pain around the vagina as you heal. Several days after delivery you may also have pain and swelling in your breasts as they fill with milk. There are things you can do at home to help ease these discomforts.  After childbirth, it's common to feel emotional. You may feel irritable, cry easily, and feel happy one minute and sad the next. This is called the \"baby blues.\" Hormone changes are one cause of these emotional changes. These feelings usually get better within a couple of weeks. If they don't, talk to your doctor or midwife.  In the first couple of weeks after you give birth, your doctor or midwife may want to check in with you and make a plan for follow-up care. You will likely have a complete " postpartum visit in the first 3 months after delivery. At that time, your doctor or midwife will check on your recovery and see how you're doing. But if you have questions or concerns before then, you can always call your doctor or midwife.  Follow-up care is a key part of your treatment and safety. Be sure to make and go to all appointments, and call your doctor if you are having problems. It's also a good idea to know your test results and keep a list of the medicines you take.  How can you care for yourself at home?  Taking care of your body  Use pads instead of tampons for bleeding. After birth, you will have bloody vaginal discharge. You may also pass some blood clots that shouldn't be bigger than an egg. Over the next 6 weeks or so, your bleeding should decrease a little every day and slowly change to a pinkish and then whitish discharge.  For cramps or mild pain, try an over-the-counter pain medicine, such as acetaminophen (Tylenol) or ibuprofen (Advil, Motrin). Read and follow all instructions on the label.  To ease pain around the vagina or from hemorrhoids:  Put ice or a cold pack on the area for 10 to 20 minutes at a time. Put a thin cloth between the ice and your skin.  Try sitting in a few inches of warm water (sitz bath) when you can or after bowel movements.  Clean yourself with a gentle squeeze of warm water from a bottle instead of wiping with toilet paper.  Use witch hazel or hemorrhoid pads (such as Tucks).  Try using a cold compress for sore and swollen breasts. And wear a supportive bra that fits.  Ease constipation by drinking plenty of fluids and eating high-fiber foods. Ask your doctor or midwife about over-the-counter stool softeners.  Activity  Rest when you can.  Ask for help from family or friends when you need it.  If you can, have another adult in your home for at least 2 or 3 days after birth.  When you feel ready, try to get some exercise every day. For many people, walking is a good  choice. Don't do any heavy exercise until your doctor or midwife says it's okay.  Ask your doctor or midwife when it is okay to have vaginal sex.  If you don't want to get pregnant, talk to your doctor or midwife about birth control options. You can get pregnant even before your period returns. Also, you can get pregnant while you are breastfeeding.  Talk to your doctor or midwife if you want to get pregnant again. They can talk to you about when it is safe.  Emotional health  It's normal to have some sadness, anxiety, and mood swings after delivery. It may help to talk with a trusted friend or family member. You can also call the Maternal Mental Health Hotline at 9-254-[facility] (1-347.574.1440) for support. If these mood changes last more than a couple of weeks, talk to your doctor or midwife.  When should you call for help?  Share this information with your partner, family, or a friend. They can help you watch for warning signs.  Call 911  anytime you think you may need emergency care. For example, call if:    You feel you cannot stop from hurting yourself, your baby, or someone else.     You passed out (lost consciousness).     You have chest pain, are short of breath, or cough up blood.     You have a seizure.   Where to get help 24 hours a day, 7 days a week   If you or someone you know talks about suicide, self-harm, a mental health crisis, a substance use crisis, or any other kind of emotional distress, get help right away. You can:    Call the Suicide and Crisis Lifeline at 448.     Call 4-586-566-TALK (1-222.963.4719).     Text HOME to 082791 to access the Crisis Text Line.   Consider saving these numbers in your phone.  Go to [employer]org for more information or to chat online.  Call your doctor or midwife now or seek immediate medical care if:    You have signs of hemorrhage (too much bleeding), such as:  Heavy vaginal bleeding. This means that you are soaking through one or more pads in an hour. Or  "you pass blood clots bigger than an egg.  Feeling dizzy or lightheaded, or you feel like you may faint.  Feeling so tired or weak that you cannot do your usual activities.  A fast or irregular heartbeat.  New or worse belly pain.     You have signs of infection, such as:  A fever.  Increased pain, swelling, warmth, or redness from an incision or wound.  Frequent or painful urination or blood in your urine.  Vaginal discharge that smells bad.  New or worse belly pain.     You have symptoms of a blood clot in your leg (called a deep vein thrombosis), such as:  Pain in the calf, back of the knee, thigh, or groin.  Swelling in the leg or groin.  A color change on the leg or groin. The skin may be reddish or purplish, depending on your usual skin color.     You have signs of preeclampsia, such as:  Sudden swelling of your face, hands, or feet.  New vision problems (such as dimness, blurring, or seeing spots).  A severe headache.     You have signs of heart failure, such as:  New or increased shortness of breath.  New or worse swelling in your legs, ankles, or feet.  Sudden weight gain, such as more than 2 to 3 pounds in a day or 5 pounds in a week.  Feeling so tired or weak that you cannot do your usual activities.     You had spinal or epidural pain relief and have:  New or worse back pain.  Increased pain, swelling, warmth, or redness at the injection site.  Tingling, weakness, or numbness in your legs or groin.   Watch closely for changes in your health, and be sure to contact your doctor or midwife if:    Your vaginal bleeding isn't decreasing.     You feel sad, anxious, or hopeless for more than a few days.     You are having problems with your breasts or breastfeeding.   Where can you learn more?  Go to https://www.healthwise.net/patiented  Enter Z768 in the search box to learn more about \"Postpartum Care at Home With Your Baby: Care Instructions.\"  Current as of: July 10, 2023               Content Version: " 14.0    5875-1092 [facility].   Care instructions adapted under license by your healthcare professional. If you have questions about a medical condition or this instruction, always ask your healthcare professional. [facility] disclaims any warranty or liability for your use of this information.

## 2024-07-07 NOTE — PROGRESS NOTES
July 7, 2024  Postpartum Progress Note:       DAILY NOTE - POSTPARTUM DAY 1    SUBJECTIVE:     Pain controlled? Yes  Ambulating? YES  Voiding without difficulty? Yes  Lochia? minimal    OBJECTIVE:  Vitals:    07/06/24 1600 07/06/24 1910 07/06/24 2300 07/07/24 0300   BP: 124/62  116/68 109/60   Resp: 16 16  16   Temp: 98  F (36.7  C)   98.2  F (36.8  C)   TempSrc: Oral   Oral   Weight:           Constitutional: Healthy, Alert, No distress  Abdomen:  Uterine fundus is firm, non-tender and at the level of the umbilicus     LABS:  Hemoglobin   Date Value Ref Range Status   07/06/2024 14.4 11.7 - 15.7 g/dL Final   09/28/2022 12.1 11.7 - 15.7 g/dL Final   06/17/2021 13.2 11.7 - 15.7 g/dL Final   02/28/2021 11.7 11.7 - 15.7 g/dL Final       ASSESSMENT:  Post-partum day #1  Normal spontaneous vaginal delivery  Doing well.     PLAN:   Discharge later today    Sania Mendez MD

## 2024-07-07 NOTE — PLAN OF CARE
Goal Outcome Evaluation:       Vitally stable. Breastfeeding well. Scant flow. Declines pain meds. Independent with cares. Attentive to infant needs.

## 2024-12-01 ENCOUNTER — HEALTH MAINTENANCE LETTER (OUTPATIENT)
Age: 29
End: 2024-12-01